# Patient Record
Sex: FEMALE | Race: WHITE | Employment: FULL TIME | URBAN - METROPOLITAN AREA
[De-identification: names, ages, dates, MRNs, and addresses within clinical notes are randomized per-mention and may not be internally consistent; named-entity substitution may affect disease eponyms.]

---

## 2021-04-27 ENCOUNTER — HOSPITAL ENCOUNTER (EMERGENCY)
Facility: CLINIC | Age: 61
Discharge: HOME | End: 2021-04-27
Attending: EMERGENCY MEDICINE | Admitting: EMERGENCY MEDICINE
Payer: COMMERCIAL

## 2021-04-27 ENCOUNTER — APPOINTMENT (OUTPATIENT)
Dept: MRI IMAGING | Facility: CLINIC | Age: 61
End: 2021-04-27
Attending: EMERGENCY MEDICINE
Payer: COMMERCIAL

## 2021-04-27 VITALS
RESPIRATION RATE: 18 BRPM | WEIGHT: 250 LBS | SYSTOLIC BLOOD PRESSURE: 165 MMHG | DIASTOLIC BLOOD PRESSURE: 87 MMHG | TEMPERATURE: 97.5 F | OXYGEN SATURATION: 96 % | HEIGHT: 63 IN | BODY MASS INDEX: 44.3 KG/M2 | HEART RATE: 96 BPM

## 2021-04-27 DIAGNOSIS — R20.2 PARESTHESIAS: ICD-10-CM

## 2021-04-27 LAB
ALBUMIN SERPL-MCNC: 3.6 G/DL (ref 3.4–5)
ALP SERPL-CCNC: 89 U/L (ref 40–150)
ALT SERPL W P-5'-P-CCNC: 50 U/L (ref 0–50)
ANION GAP SERPL CALCULATED.3IONS-SCNC: 3 MMOL/L (ref 3–14)
AST SERPL W P-5'-P-CCNC: 38 U/L (ref 0–45)
BASOPHILS # BLD AUTO: 0 10E9/L (ref 0–0.2)
BASOPHILS NFR BLD AUTO: 0.5 %
BILIRUB SERPL-MCNC: 0.9 MG/DL (ref 0.2–1.3)
BUN SERPL-MCNC: 19 MG/DL (ref 7–30)
CALCIUM SERPL-MCNC: 9 MG/DL (ref 8.5–10.1)
CHLORIDE SERPL-SCNC: 105 MMOL/L (ref 94–109)
CO2 SERPL-SCNC: 27 MMOL/L (ref 20–32)
CREAT SERPL-MCNC: 0.58 MG/DL (ref 0.52–1.04)
DIFFERENTIAL METHOD BLD: NORMAL
EOSINOPHIL # BLD AUTO: 0.2 10E9/L (ref 0–0.7)
EOSINOPHIL NFR BLD AUTO: 3 %
ERYTHROCYTE [DISTWIDTH] IN BLOOD BY AUTOMATED COUNT: 12.4 % (ref 10–15)
GFR SERPL CREATININE-BSD FRML MDRD: >90 ML/MIN/{1.73_M2}
GLUCOSE SERPL-MCNC: 227 MG/DL (ref 70–99)
HCT VFR BLD AUTO: 46.7 % (ref 35–47)
HGB BLD-MCNC: 15.5 G/DL (ref 11.7–15.7)
IMM GRANULOCYTES # BLD: 0 10E9/L (ref 0–0.4)
IMM GRANULOCYTES NFR BLD: 0.3 %
LYMPHOCYTES # BLD AUTO: 2.3 10E9/L (ref 0.8–5.3)
LYMPHOCYTES NFR BLD AUTO: 31.7 %
MCH RBC QN AUTO: 31.2 PG (ref 26.5–33)
MCHC RBC AUTO-ENTMCNC: 33.2 G/DL (ref 31.5–36.5)
MCV RBC AUTO: 94 FL (ref 78–100)
MONOCYTES # BLD AUTO: 0.5 10E9/L (ref 0–1.3)
MONOCYTES NFR BLD AUTO: 7.2 %
NEUTROPHILS # BLD AUTO: 4.2 10E9/L (ref 1.6–8.3)
NEUTROPHILS NFR BLD AUTO: 57.3 %
NRBC # BLD AUTO: 0 10*3/UL
NRBC BLD AUTO-RTO: 0 /100
PLATELET # BLD AUTO: 240 10E9/L (ref 150–450)
POTASSIUM SERPL-SCNC: 4.7 MMOL/L (ref 3.4–5.3)
PROT SERPL-MCNC: 7.4 G/DL (ref 6.8–8.8)
RBC # BLD AUTO: 4.97 10E12/L (ref 3.8–5.2)
SODIUM SERPL-SCNC: 135 MMOL/L (ref 133–144)
WBC # BLD AUTO: 7.3 10E9/L (ref 4–11)

## 2021-04-27 PROCEDURE — 99285 EMERGENCY DEPT VISIT HI MDM: CPT | Mod: 25

## 2021-04-27 PROCEDURE — 96374 THER/PROPH/DIAG INJ IV PUSH: CPT

## 2021-04-27 PROCEDURE — 80053 COMPREHEN METABOLIC PANEL: CPT | Performed by: EMERGENCY MEDICINE

## 2021-04-27 PROCEDURE — 72146 MRI CHEST SPINE W/O DYE: CPT | Mod: 52

## 2021-04-27 PROCEDURE — 250N000012 HC RX MED GY IP 250 OP 636 PS 637: Performed by: EMERGENCY MEDICINE

## 2021-04-27 PROCEDURE — 250N000011 HC RX IP 250 OP 636: Performed by: EMERGENCY MEDICINE

## 2021-04-27 PROCEDURE — 85025 COMPLETE CBC W/AUTO DIFF WBC: CPT | Performed by: EMERGENCY MEDICINE

## 2021-04-27 PROCEDURE — 72141 MRI NECK SPINE W/O DYE: CPT

## 2021-04-27 RX ORDER — LORAZEPAM 2 MG/ML
2 INJECTION INTRAMUSCULAR ONCE
Status: COMPLETED | OUTPATIENT
Start: 2021-04-27 | End: 2021-04-27

## 2021-04-27 RX ORDER — PREDNISONE 20 MG/1
40 TABLET ORAL ONCE
Status: COMPLETED | OUTPATIENT
Start: 2021-04-27 | End: 2021-04-27

## 2021-04-27 RX ORDER — METHYLPREDNISOLONE 4 MG
TABLET, DOSE PACK ORAL
Qty: 21 TABLET | Refills: 0 | Status: SHIPPED | OUTPATIENT
Start: 2021-04-27 | End: 2021-08-10

## 2021-04-27 RX ADMIN — PREDNISONE 40 MG: 20 TABLET ORAL at 22:52

## 2021-04-27 RX ADMIN — LORAZEPAM 2 MG: 2 INJECTION INTRAMUSCULAR; INTRAVENOUS at 19:05

## 2021-04-27 ASSESSMENT — ENCOUNTER SYMPTOMS
BACK PAIN: 1
HEADACHES: 0
SHORTNESS OF BREATH: 0
NUMBNESS: 1

## 2021-04-27 ASSESSMENT — MIFFLIN-ST. JEOR: SCORE: 1663.12

## 2021-04-27 NOTE — ED TRIAGE NOTES
Tingling in hands bilaterally since last Friday, also numbness from the waist down, more numb from the knees down. Seen at [facility] on Friday, states they ruled out stroke, but starting yesterday she is having trouble feeling when she has a bowel movement.

## 2021-04-27 NOTE — ED PROVIDER NOTES
"  History     Chief Complaint:  Tingling and Numbness       HPI  Flora Miranda is a 61 year old female tobacco user with a history of type 2 diabetes and HTN who presents for evaluation of tingling and numbness.The patient was seen in the ED at [facility] on 4/22/21 for paresthesias. The patient reports that she has had bilateral hand and finger tingling since last Friday (4/22/21) that worsened today. She also has back pain and numbness hat radiates down to her buttocks, legs, and feet that is worse on the left side. She notes that she has had loss of bowel control yesterday and one time earlier this week. She went to the chiropractor today for her back. She has recently been using a walker to get around due to the numbness in her feet. She denies any headache, shortness of breath, chest pain, or any recent falls.       Review of Systems   Respiratory: Negative for shortness of breath.    Cardiovascular: Negative for chest pain.   Genitourinary:        Bowel incontinence   Musculoskeletal: Positive for back pain and gait problem.   Neurological: Positive for numbness. Negative for headaches.        Tingling in fingers   All other systems reviewed and are negative.      Allergies:  No Known Allergies    Medications:   Amoxicillin   Tessalon Perles   Aspirin 81   Zestril   Lipitor  Glucotrol   Lantus  Metformin  Valtrex    Medical History:   Obesity  Type 2 diabetes    Past Surgical History:    Ovarian cyst removal    Family History:    Father: cancer, heart attack, HTN  Mother: emphysema  Brother: type 2 diabetes    Social History:  The patient was accompanied to the ED by boyfriend.  PCP: Donte, [facility]    Physical Exam     Patient Vitals for the past 24 hrs:   BP Temp Temp src Pulse Resp SpO2 Height Weight   04/27/21 1553 (!) 165/87 97.5  F (36.4  C) Oral 96 18 96 % 1.6 m (5' 3\") 113.4 kg (250 lb)      Physical Exam  Constitutional: heavy set, white female. Supine. No respiratory distress. "   HENT: No signs of trauma.   Eyes: EOM are normal. Pupils are equal, round, and reactive to light.   Neck: Normal range of motion. No JVD present. No cervical adenopathy. No posterior midline tenderness. No carotid bruit.  Cardiovascular: Regular rhythm.  Exam reveals no gallop and no friction rub.    No murmur heard.  Pulmonary/Chest: Bilateral breath sounds normal. No wheezes, rhonchi or rales.  Abdominal: Soft. No tenderness. No rebound or guarding.   Musculoskeletal: No edema. No tenderness. Positive straight leg raising on the left leg. No tenderness over thoracic or lumbar spine. Large lipoma right upper back.   Lymphadenopathy: No lymphadenopathy.   Neurological: Alert and oriented to person, place, and time. Normal strength. Coordination normal. Sensation intact to light touch;  Subjective sensation of pins and needles extending bilaterally up to knee and involving fingertips bilaterally. Reflexes absent both upper and lower extremities. Questionably decreased rectal tone. Normal perianal sensation.   Skin: Skin is warm and dry. . Moist reddened rash under lower abdominal fold.    Emergency Department Course   Imaging:  Cervical spine MRI w/o contrast  Multilevel degenerative change as detailed.    BRANDEE ARIAS MD  Reading per radiology     Thoracic spine MRI w/o contrast  Moderate to severe degenerative change with multiple  stenoses.    Mri L spine:  Refused    BRANDEE ARIAS MD  Reading per radiology     Laboratory:  CBC: WBC 7.3, HGB 15.5,   CMP: glucose 227 (Creatinine 0.58)    Emergency Department Course:  Reviewed:  1742 I reviewed nursing notes, vitals, past medical history and care everywhere    Assessments:  2235 I rechecked the patient and explained findings.     Interventions:  1905 Ativan 2 mg IV    Disposition:  The patient was discharged to home.   Impression & Plan   Medical Decision Making:  Flora Miranda is a 61 year old female that presents to the ED with tingling in her hands  and her lower extremities also developing some problems while passing her stool. Patient was seen at [facility] by Dr. Wiggins 5 days ago. EKG, blood, and urine were obtained, which were unremarkable and was discussed that the patient should follow-up with her primary for concerns she was developing diabetic neuropathy. Patient states she still has tingling in her finger tips and has tingling in her legs which extend from her toes all the way up to her knees on both sides. When asked about stool and urine incontinence she states that she has had two episodes where she needs to go to the bathroom with a bowel movement that does not make it to the restroom in time. This is new for her. She is controlling her urine well and she denies any perianal numbness. She did get a walker however because it has been difficult to walk with the tingling. She did see her chiropractor again this week who stressed that she should have further imaging. On my examination, objectively she has sensation, but subjectively she feels pins and needles in the above distribution. Her reflexes are remarkably flattened everywhere and she has perianal sensation, but rectal tone seems somewhat not flatulent, but seemed diminished. When I asked her to squeeze on my finger. I discussed with the patient that it was time to get MRI's since both hands and feet her involved. We decided to MRI her entire spine. Patient received some ativan since she thought she might be anxious with this. She completed the CT and T spine, but unfortunately, when it got to the L spine she would not continue. Her C spine and T spine showed significant degenerative changes, but no core lesions or abnormalities. I did talk to her when she got back about trying again and maybe with additional medicine, but she refused this. I also discussed admission and she refused that. I have talked to her about speaking with her primary tomorrow and possibly try to get an open-sided MRI  which would work better for her. This is not available at [facility]. I did recommend giving her some steroids to see if there was inflammation from possibly a degenerative lesion or core compression that this might be helpful and she did agree. Patient will follow-up with her primary tomorrow. I told her if symptoms worsen to come back to the ED or if she changes her mind and wants to do the MRI again to come back to the ED.     Diagnosis:     ICD-10-CM    1. Paresthesias  R20.2         Discharge Medications:  Discharge Medication List as of 4/27/2021 10:50 PM      START taking these medications    Details   methylPREDNISolone (MEDROL DOSEPAK) 4 MG tablet therapy pack Follow Package Directions, Disp-21 tablet, R-0, Local Print             Scribe Disclosure:  I, Zahra Lares, am serving as a scribe at 5:28 PM on 4/27/2021 to document services personally performed by Artis Warner MD based on my observations and the provider's statements to me.     [facility] Artis Valles MD  04/28/21 0006

## 2021-08-07 ENCOUNTER — HEALTH MAINTENANCE LETTER (OUTPATIENT)
Age: 61
End: 2021-08-07

## 2021-08-10 ENCOUNTER — OFFICE VISIT (OUTPATIENT)
Dept: FAMILY MEDICINE | Facility: CLINIC | Age: 61
End: 2021-08-10
Payer: COMMERCIAL

## 2021-08-10 VITALS
SYSTOLIC BLOOD PRESSURE: 112 MMHG | TEMPERATURE: 97.5 F | HEIGHT: 63 IN | DIASTOLIC BLOOD PRESSURE: 77 MMHG | RESPIRATION RATE: 10 BRPM | BODY MASS INDEX: 43.59 KG/M2 | WEIGHT: 246 LBS | HEART RATE: 93 BPM | OXYGEN SATURATION: 97 %

## 2021-08-10 DIAGNOSIS — E66.01 MORBID OBESITY (H): ICD-10-CM

## 2021-08-10 DIAGNOSIS — E11.9 TYPE 2 DIABETES MELLITUS WITHOUT COMPLICATION, WITH LONG-TERM CURRENT USE OF INSULIN (H): Primary | ICD-10-CM

## 2021-08-10 DIAGNOSIS — Z72.0 TOBACCO ABUSE: ICD-10-CM

## 2021-08-10 DIAGNOSIS — Z79.4 TYPE 2 DIABETES MELLITUS WITHOUT COMPLICATION, WITH LONG-TERM CURRENT USE OF INSULIN (H): Primary | ICD-10-CM

## 2021-08-10 PROBLEM — G62.89: Status: ACTIVE | Noted: 2021-05-27

## 2021-08-10 LAB — HBA1C MFR BLD: 8 % (ref 0–5.6)

## 2021-08-10 PROCEDURE — 99203 OFFICE O/P NEW LOW 30 MIN: CPT | Performed by: INTERNAL MEDICINE

## 2021-08-10 PROCEDURE — 83036 HEMOGLOBIN GLYCOSYLATED A1C: CPT | Performed by: INTERNAL MEDICINE

## 2021-08-10 PROCEDURE — 36415 COLL VENOUS BLD VENIPUNCTURE: CPT | Performed by: INTERNAL MEDICINE

## 2021-08-10 PROCEDURE — 82043 UR ALBUMIN QUANTITATIVE: CPT | Performed by: INTERNAL MEDICINE

## 2021-08-10 RX ORDER — LISINOPRIL 10 MG/1
10 TABLET ORAL DAILY
COMMUNITY
Start: 2021-07-03 | End: 2021-09-09

## 2021-08-10 RX ORDER — LISINOPRIL 5 MG/1
5 TABLET ORAL DAILY
COMMUNITY
Start: 2020-10-24 | End: 2021-08-10

## 2021-08-10 RX ORDER — ATORVASTATIN CALCIUM 20 MG/1
20 TABLET, FILM COATED ORAL
COMMUNITY
Start: 2020-12-17 | End: 2021-09-09

## 2021-08-10 RX ORDER — GLIPIZIDE 10 MG/1
TABLET ORAL
COMMUNITY
Start: 2021-08-05 | End: 2021-09-09

## 2021-08-10 ASSESSMENT — MIFFLIN-ST. JEOR: SCORE: 1644.98

## 2021-08-10 NOTE — PROGRESS NOTES
Assessment & Plan     Type 2 diabetes mellitus without complication, with long-term current use of insulin (H)  Sees eye doctor yearly  Last a1c 6.9.  She was previously on ozempic instead of insulin and reports well controlled diabetes, however insurance stopped covering. She plans to look into this given another insurance change and if feasible, she can reach out to be and rx can be provided. I would recommend holding insulin if starting ozempic and close follow-up. She tolerated it well.  Update labs today on insulin/metformin/glipizie  Continue statin/ACEi  - Hemoglobin A1c  - Albumin Random Urine Quantitative with Creat Ratio    Tobacco abuse  Smoking cessation discussed, she is not quite ready    Morbid obesity (H)      Return in about 3 months (around 11/10/2021) for with me, in person, sooner if symptoms worsen or do not improve, Follow up.    Nicki Renee DO  [facility] CELESTE Hines is a 61 year old who presents for the following health issues     HPI     New Patient/Transfer of Care    Former PCP: [facility]  Problem list and medications reviewed and updated. See below for additional notes.    Patient was evaluted in ER for acute parasthesias and referred to neuro for possible guillan barre, saw two neurologists. With time self improved. Has residual tingling in L distal fingers, but also told possible carpal tunnel. No relation to vaccines. No clear cause. Used walker for two weeks, now no walker at all. Did not have to get therapeutic infusion. Initial neuro was not in network so saw another neuro and by then symptoms better.    Last a1c was 6.9, back in Dec.   Diabetes-was on ozempic but insurance stopped covering, tried every avenue to get coverage. Did not work. Therefore, switched to lantus. States a1c was right around 7 on ozempic. No SE.    Smokes 1/2 pack a day, not looking to quit right now.      Review of Systems   Constitutional, HEENT, cardiovascular,  "pulmonary, gi and gu systems are negative, except as otherwise noted.      Objective    /77 (BP Location: Left arm, Patient Position: Sitting, Cuff Size: Adult Large)   Pulse 93   Temp 97.5  F (36.4  C) (Temporal)   Resp 10   Ht 1.6 m (5' 3\")   Wt 111.6 kg (246 lb)   SpO2 97%   BMI 43.58 kg/m    Body mass index is 43.58 kg/m .  Physical Exam     GENERAL APPEARANCE: AAOx3, no distress. Well developed.    PSYCH: appropriate mood and affect.               "

## 2021-08-11 LAB
CREAT UR-MCNC: 112 MG/DL
MICROALBUMIN UR-MCNC: 40 MG/L
MICROALBUMIN/CREAT UR: 35.71 MG/G CR (ref 0–25)

## 2021-08-24 ENCOUNTER — VIRTUAL VISIT (OUTPATIENT)
Dept: PHARMACY | Facility: CLINIC | Age: 61
End: 2021-08-24
Payer: COMMERCIAL

## 2021-08-24 DIAGNOSIS — Z23 NEED FOR VACCINATION: ICD-10-CM

## 2021-08-24 DIAGNOSIS — E78.5 HYPERLIPIDEMIA LDL GOAL <100: ICD-10-CM

## 2021-08-24 DIAGNOSIS — I10 BENIGN ESSENTIAL HYPERTENSION: ICD-10-CM

## 2021-08-24 DIAGNOSIS — E11.9 TYPE 2 DIABETES MELLITUS WITHOUT COMPLICATION, WITH LONG-TERM CURRENT USE OF INSULIN (H): Primary | ICD-10-CM

## 2021-08-24 DIAGNOSIS — Z78.9 TAKES DIETARY SUPPLEMENTS: ICD-10-CM

## 2021-08-24 DIAGNOSIS — Z79.4 TYPE 2 DIABETES MELLITUS WITHOUT COMPLICATION, WITH LONG-TERM CURRENT USE OF INSULIN (H): Primary | ICD-10-CM

## 2021-08-24 PROCEDURE — 99207 PR NO CHARGE LOS: CPT | Performed by: PHARMACIST

## 2021-08-24 RX ORDER — UREA 10 %
2000 LOTION (ML) TOPICAL DAILY
COMMUNITY
End: 2022-03-16

## 2021-08-24 NOTE — PROGRESS NOTES
Medication Therapy Management (MTM) Encounter    ASSESSMENT:                            Medication Adherence/Access: No issues identified    Type 2 Diabetes: Patient is not meeting A1c goal of < 7%. Self monitoring of blood glucose is not at goal of fasting  mg/dL and post prandial < 180 mg/dL.  Would like to get her back on a GLP1 RA if possible.    Hypertension: Stable. Patient is meeting blood pressure goal of < 130/80mmHg.     Hyperlipidemia: Stable.  Patient is on moderate intensity statin which is indicated based on 2019 ACC/AHA guidelines for lipid management.       Supplements:  Carotenoids have been associated with increased risk of lung cancer in those who smoke or have a history of smoking.  She may benefit from discontinuing Cell Wise.  She's also taking high doses of potassium and magnesium - last potassium level was WNL, may benefit from checking magnesium level (not discussed today).    Immunizations:  She's due for several vaccinations.    PLAN:                            1.  Could consider changing back to GLP1 RA.  Will email the names of once weekly GLP1 RA so she can talk with insurance about coverage.  From Epic - it appears Ozempic is covered, but unclear what her co-pay would be.  2.  Recommend stopping Cell Wise due to carotenoids.  Given current smoking use, this potentially may increase the risk of lung cancer.  3.  Recommended several vaccines - she declined.  4.  Future considerations - check magnesium level.    Follow-up: Return in about 2 weeks (around 9/7/2021) for check in via [employer].    SUBJECTIVE/OBJECTIVE:                          Flora Miranda is a 61 year old female called for an initial visit. She was referred to me from Dr. Renee.      Reason for visit: Comprehensive medication review.    Allergies/ADRs: None  Past Medical History: Reviewed in chart  Tobacco: She reports that she has been smoking. She has a 22.50 pack-year smoking history. She has never used smokeless  tobacco.Tobacco Cessation Action Plan:   Information offered: Patient not interested at this time   Alcohol: Less than 1 beverages / week    Medication Adherence/Access: Patient uses pill box(es).  Patient takes medications 2 time(s) per day.  Per patient, misses medication 0 times per week.     Type 2 Diabetes:  Currently taking metformin 1000mg twice daily, glipizide 10mg twice daily and Lantus 30 units daily. Patient is not experiencing side effects.  She was on Ozempic instead of Lantus in the past - this worked great (A1c 6.9%) but when she changed insurance it wasn't covered.  She's since changed insurance again, but hasn't inquired about coverage.  Blood sugar monitoring: rarely. Ranges (patient reported): 150-220mg/dL (higher in the evening)  Symptoms of low blood sugar? none  Symptoms of high blood sugar? none  Eye exam: due  Foot exam: due  Aspirin: Taking 81mg daily and denies side effects  Statin: Yes: atorvastatin   ACEi/ARB: Yes: lisinopril.   Urine Albumin:   Lab Results   Component Value Date    UMALCR 35.71 (H) 08/10/2021      Lab Results   Component Value Date    A1C 8.0 08/10/2021     Hypertension: Current medications include lisinopril 10mg daily.  Patient does not self-monitor blood pressure.  Patient reports no current medication side effects.  BP Readings from Last 3 Encounters:   08/10/21 112/77   04/27/21 (!) 165/87   03/16/11 122/80      Hyperlipidemia: Current therapy includes atorvastatin 20mg daily.  Patient reports no significant myalgias or other side effects.  From Care Everywhere - 12/4/20:      Supplements:  Current supplements include: calcium/vitamin D 1 tablet daily, magnesium gluconate 2000mg daily, a daily multivitamin, Vitamin D daily (dose unknown), potassium 2000mg daily (product unknown) and Melaluca Cell Wise 1 tablet daily (contains olive extract, grape seed extract, vitamin C, carotenoids, lycopene and tocopherols).  She finds these effective and prefers to continue  taking.  Potassium   Date Value Ref Range Status   04/27/2021 4.7 3.4 - 5.3 mmol/L Final     Comment:     Specimen slightly hemolyzed, potassium may be falsely elevated      No results found for: MAG     Immunizations:  Patient declines all vaccines, including COVID-19 vaccine.  Immunization History   Administered Date(s) Administered     Tdap (Adacel,Boostrix) 04/18/2013      Today's Vitals: There were no vitals taken for this visit.  ----------------    I spent 17 minutes with this patient today. A copy of the visit note was provided to the patient's primary care provider.    The patient was sent via [employer] a summary of these recommendations.     Gema Figueredo, PharmD, [facility]  Medication Therapy Management Provider  Pager: [phone]     Telemedicine Visit Details  Type of service:  Telephone visit  Start Time: 10:02 AM  End Time: 10:19 AM  Originating Location (patient location): [place]  Distant Location (provider location):  [facility]     Medication Therapy Recommendations  Need for vaccination    Rationale: Preventive therapy - Needs additional medication therapy - Indication   Recommendation: Start Medication - COVID-19 MRNA VIRUS VACCINE   Status: Declined per Patient         Takes dietary supplements    Current Medication: UNABLE TO FIND   Rationale: Unsafe medication for the patient - Adverse medication event - Safety   Recommendation: Discontinue Medication   Status: Accepted - no CPA Needed         Type 2 diabetes mellitus without complication (H)    Current Medication: insulin glargine (LANTUS PEN) 100 UNIT/ML pen   Rationale: More effective medication available - Ineffective medication - Effectiveness   Recommendation: Change Medication - Ozempic (0.25 or 0.5 MG/DOSE) 2 MG/1.5ML Sopn   Status: Contact Provider - Awaiting Response

## 2021-08-24 NOTE — PATIENT INSTRUCTIONS
Recommendations from today's MTM visit:                                                    MTM (medication therapy management) is a service provided by a clinical pharmacist designed to help you get the most of out of your medicines.   Today we reviewed what your medicines are for, how to know if they are working, that your medicines are safe and how to make your medicine regimen as easy as possible.      1.  Please check with your insurance to see if Ozempic, Trulicity or Bydureon would be covered.    2.  Recommend stopping Cell Wise due to carotenoids.  Given current smoking use, this potentially may increase the risk of lung cancer.    3.  You are due for several vaccines, but declined these.    Follow-up: Return in about 2 weeks (around 9/7/2021) for check in via [employer]    It was great to speak with you today.  I value your experience and would be very thankful for your time with providing feedback on our clinic survey. You may receive a survey via email or text message in the next few days.     To schedule another MTM appointment, please call the clinic directly or you may call the MTM scheduling line at [phone] or toll-free at [phone].     My Clinical Pharmacist's contact information:                                                      Please feel free to contact me with any questions or concerns you have.      Gema Figueredo PharmD, [facility]  Medication Therapy Management Provider  Pager: [phone]     Yue Malagon, Sher[place]  Medication Therapy Management Resident  Pager: [phone]

## 2021-09-03 ENCOUNTER — MYC MEDICAL ADVICE (OUTPATIENT)
Dept: FAMILY MEDICINE | Facility: CLINIC | Age: 61
End: 2021-09-03

## 2021-09-03 DIAGNOSIS — E11.9 TYPE 2 DIABETES MELLITUS WITHOUT COMPLICATION, WITH LONG-TERM CURRENT USE OF INSULIN (H): Primary | ICD-10-CM

## 2021-09-03 DIAGNOSIS — Z79.4 TYPE 2 DIABETES MELLITUS WITHOUT COMPLICATION, WITH LONG-TERM CURRENT USE OF INSULIN (H): Primary | ICD-10-CM

## 2021-09-09 RX ORDER — GLIPIZIDE 10 MG/1
TABLET ORAL
Qty: 180 TABLET | Refills: 3 | Status: SHIPPED | OUTPATIENT
Start: 2021-09-09 | End: 2021-09-13

## 2021-09-09 RX ORDER — LISINOPRIL 10 MG/1
10 TABLET ORAL DAILY
Qty: 90 TABLET | Refills: 3 | Status: SHIPPED | OUTPATIENT
Start: 2021-09-09 | End: 2021-09-13

## 2021-09-09 RX ORDER — ATORVASTATIN CALCIUM 20 MG/1
20 TABLET, FILM COATED ORAL DAILY
Qty: 90 TABLET | Refills: 3 | Status: SHIPPED | OUTPATIENT
Start: 2021-09-09 | End: 2021-09-13

## 2021-09-09 NOTE — TELEPHONE ENCOUNTER
Routing refill request to provider for review/approval because:  Medication is reported/historical     Adeola JETER RN

## 2021-09-13 DIAGNOSIS — E11.9 TYPE 2 DIABETES MELLITUS WITHOUT COMPLICATION, WITH LONG-TERM CURRENT USE OF INSULIN (H): ICD-10-CM

## 2021-09-13 DIAGNOSIS — Z79.4 TYPE 2 DIABETES MELLITUS WITHOUT COMPLICATION, WITH LONG-TERM CURRENT USE OF INSULIN (H): ICD-10-CM

## 2021-09-13 RX ORDER — ATORVASTATIN CALCIUM 20 MG/1
20 TABLET, FILM COATED ORAL DAILY
Qty: 90 TABLET | Refills: 3 | Status: SHIPPED | OUTPATIENT
Start: 2021-09-13 | End: 2022-11-16

## 2021-09-13 RX ORDER — LISINOPRIL 10 MG/1
10 TABLET ORAL DAILY
Qty: 90 TABLET | Refills: 3 | Status: SHIPPED | OUTPATIENT
Start: 2021-09-13 | End: 2022-10-06

## 2021-09-13 RX ORDER — GLIPIZIDE 10 MG/1
TABLET ORAL
Qty: 180 TABLET | Refills: 3 | Status: SHIPPED | OUTPATIENT
Start: 2021-09-13 | End: 2022-11-01

## 2021-09-13 NOTE — TELEPHONE ENCOUNTER
Prescription approved per [facility] Refill Protocol.  Maisha Ruiz RN  [facility]

## 2021-09-13 NOTE — TELEPHONE ENCOUNTER
Routing refill request to provider for review/approval because:  Labs out of range:  No results found for: LDL      Maisha Ruiz RN  [facility] Triage

## 2021-10-02 ENCOUNTER — HEALTH MAINTENANCE LETTER (OUTPATIENT)
Age: 61
End: 2021-10-02

## 2021-11-27 ENCOUNTER — HEALTH MAINTENANCE LETTER (OUTPATIENT)
Age: 61
End: 2021-11-27

## 2021-12-28 ENCOUNTER — OFFICE VISIT (OUTPATIENT)
Dept: URGENT CARE | Facility: URGENT CARE | Age: 61
End: 2021-12-28
Payer: COMMERCIAL

## 2021-12-28 VITALS
HEART RATE: 89 BPM | BODY MASS INDEX: 44.99 KG/M2 | RESPIRATION RATE: 20 BRPM | OXYGEN SATURATION: 95 % | SYSTOLIC BLOOD PRESSURE: 130 MMHG | WEIGHT: 254 LBS | TEMPERATURE: 98.5 F | DIASTOLIC BLOOD PRESSURE: 78 MMHG

## 2021-12-28 DIAGNOSIS — J01.90 ACUTE SINUSITIS WITH COEXISTING CONDITION REQUIRING PROPHYLACTIC TREATMENT: Primary | ICD-10-CM

## 2021-12-28 PROCEDURE — 99213 OFFICE O/P EST LOW 20 MIN: CPT | Performed by: FAMILY MEDICINE

## 2022-03-01 ENCOUNTER — TELEPHONE (OUTPATIENT)
Dept: PHARMACY | Facility: CLINIC | Age: 62
End: 2022-03-01
Payer: COMMERCIAL

## 2022-03-01 NOTE — TELEPHONE ENCOUNTER
We have been unable to reach this patient for MTM follow-up after several attempts. We will stop reaching out to the patient at this time. Please let us know if we can assist in this patient's care in the future.    Routing to PCP as Christina HuffD, [facility]  Medication Therapy Management Provider  Pager: [phone]

## 2022-03-16 ENCOUNTER — VIRTUAL VISIT (OUTPATIENT)
Dept: PHARMACY | Facility: CLINIC | Age: 62
End: 2022-03-16
Payer: COMMERCIAL

## 2022-03-16 DIAGNOSIS — Z79.4 TYPE 2 DIABETES MELLITUS WITHOUT COMPLICATION, WITH LONG-TERM CURRENT USE OF INSULIN (H): Primary | ICD-10-CM

## 2022-03-16 DIAGNOSIS — E78.5 HYPERLIPIDEMIA LDL GOAL <100: ICD-10-CM

## 2022-03-16 DIAGNOSIS — I10 BENIGN ESSENTIAL HYPERTENSION: ICD-10-CM

## 2022-03-16 DIAGNOSIS — Z13.21 ENCOUNTER FOR VITAMIN DEFICIENCY SCREENING: ICD-10-CM

## 2022-03-16 DIAGNOSIS — E11.9 TYPE 2 DIABETES MELLITUS WITHOUT COMPLICATION, WITH LONG-TERM CURRENT USE OF INSULIN (H): Primary | ICD-10-CM

## 2022-03-16 PROCEDURE — 99207 PR NO CHARGE LOS: CPT | Performed by: PHARMACIST

## 2022-03-16 RX ORDER — DULAGLUTIDE 0.75 MG/.5ML
0.75 INJECTION, SOLUTION SUBCUTANEOUS
Qty: 2 ML | Refills: 0 | Status: SHIPPED | OUTPATIENT
Start: 2022-03-16 | End: 2022-04-18

## 2022-03-16 RX ORDER — DULAGLUTIDE 1.5 MG/.5ML
1.5 INJECTION, SOLUTION SUBCUTANEOUS
Qty: 2 ML | Refills: 2 | Status: SHIPPED | OUTPATIENT
Start: 2022-03-16 | End: 2022-06-23

## 2022-03-16 NOTE — PATIENT INSTRUCTIONS
Recommendations from today's MTM visit:                                                    MTM (medication therapy management) is a service provided by a clinical pharmacist designed to help you get the most of out of your medicines.   Today we reviewed what your medicines are for, how to know if they are working, that your medicines are safe and how to make your medicine regimen as easy as possible.      1. You are due for labs (A1c, lipid panel, BMP, vitamin D)    2. It looks like insurance prefers Trulicity, so we will try sending in a prescription for Trulicity to determine coverage. We can also try using a coupon to bring down the copay for you. You will start by injecting Trulicity 0.75 mg once weekly for 4 weeks. If you are tolerating well, you can increase to 1.5 mg once weekly thereafter.     3. Yue will send in a prescription for FreeStyle Susannah 2 CGM to test your blood sugars.     4. Make an appointment with Dr. Renee    Follow-up:  Smarp Oy message to follow-up on GLP-1/CGM; phone visit on 4/18    It was great to speak with you today.  I value your experience and would be very thankful for your time with providing feedback on our clinic survey. You may receive a survey via email or text message in the next few days.     To schedule another MTM appointment, please call the clinic directly or you may call the MTM scheduling line at [phone] or toll-free at [phone].     My Clinical Pharmacist's contact information:                                                      Please feel free to contact me with any questions or concerns you have.      Yue Malagon, PharmD   Medication Therapy Management   Pharmacist Resident  Pager: [phone]

## 2022-03-19 ENCOUNTER — HEALTH MAINTENANCE LETTER (OUTPATIENT)
Age: 62
End: 2022-03-19

## 2022-04-17 NOTE — PROGRESS NOTES
Medication Therapy Management (MTM) Encounter    ASSESSMENT:                            Medication Adherence/Access: No issues identified    Type 2 Diabetes: Patient is not meeting A1c goal of < 8%. Self monitoring of blood glucose is at goal of fasting  mg/dL and post prandial < 180 mg/dL. It is unclear if patient is meeting goal of >70% time in target with continuous glucose monitoring. However, it seems that Trulicity has been a good change for her. Could consider increasing Trulicity dose in future if needed. Patient due for A1c, BMP, foot exam, and eye exam.     Hypertension: Stable. Patient is meeting blood pressure goal of < 140/90mmHg.    Hyperlipidemia: Stable. Patient is on moderate intensity statin which is indicated based on 2019 ACC/AHA guidelines for lipid management. Patient due for lipid panel.     Immunizations: Patient is due for the Shingrix, COVID, influenza, and pneumonia vaccinations. This was discussed with patient, but she prefers not to get at this time.     PLAN:                            1. You are due to get labs. As we discussed, you can get these before your next appointment with Dr. Renee in May.     2. You are due to get your eyes checked. At your appointment with Dr. Renee, have her check your feet as well.     Follow-up: check-in after PCP visit on 5/16/22    SUBJECTIVE/OBJECTIVE:                          Flora Miranda is a 62 year old female called for a follow-up visit.  Today's visit is a follow-up MTM visit from 3/16/22     Reason for visit: diabetes f/up.    Allergies/ADRs: Reviewed in chart  Past Medical History: Reviewed in chart  Tobacco: She reports that she has been smoking. She has a 22.50 pack-year smoking history. She has never used smokeless tobacco.   Tobacco Cessation Action Plan:   Information offered: Patient not interested at this time    Medication Adherence/Access: no issues reported. Reports perfect adherence with medication regimen and finds cost of  medications to be affordable.     Type 2 Diabetes:  Currently taking metformin 1000 mg twice daily, glipizide 10 mg twice daily, and Trulicity 1.5 mg weekly (Sundays). Sometimes experiences some nausea after Trulicity, but otherwise tolerates the medication well. She likes the Trulicity much better than the insulin.   Reports that she has lost about 10 pounds since starting Trulicity.  Blood sugar monitoring: Continuous Glucose Monitor. Ranges (patient reported): 117 - 179 mg/dL. She is unable to share her CGM data during the phone call, but plans to accept the remote sharing invite afterwards.   Symptoms of low blood sugar? Felt nauseous at 107 mg/dL, but felt better after she ate something  Symptoms of high blood sugar? None - feels like she isn't as tired as she used to be  Eye exam: due - will schedule soon   Foot exam: due  Diet/Exercise: She reports she has been walking a lot more. Feeling full faster so eating less.   Aspirin: Taking 81mg daily and denies side effects  Statin: Yes: atorvastatin 20 mg daily   ACEi/ARB: Yes: lisinopril 10 mg daily.   Urine Albumin:   Lab Results   Component Value Date    UMALCR 35.71 (H) 08/10/2021      Lab Results   Component Value Date    A1C 8.0 08/10/2021     Hypertension: Current medications include lisinopril 10 mg daily.  Patient does not self-monitor blood pressure.  Patient reports no current medication side effects.  BP Readings from Last 3 Encounters:   12/28/21 130/78   08/10/21 112/77   04/27/21 (!) 165/87     Hyperlipidemia: Current therapy includes atorvastatin 20 mg daily.  Patient reports no significant myalgias or other side effects.  The ASCVD Risk score (Edwards PABLO Jr., et al., 2013) failed to calculate for the following reasons:    Cannot find a previous HDL lab    Cannot find a previous total cholesterol lab  From Care Everywhere - 12/4/20:    Immunizations: Patient has not yet had Shingrix, COVID, influenza, and pneumonia vaccinations.  Most Recent  Immunizations   Administered Date(s) Administered     Tdap (Adacel,Boostrix) 04/18/2013     Today's Vitals: There were no vitals taken for this visit.  ----------------  I spent 15 minutes with this patient today. All changes were made via collaborative practice agreement with Nicki Renee DO. A copy of the visit note was provided to the patient's provider(s).    The patient was sent via [employer] a summary of these recommendations.     Yue Malagon, ChristinaD   Pharmaceutical Care Resident   Medication Therapy Management    The patient was seen independently by Dr. Malagon.  I have read the note and agree with the assessment and plan.  Gema Figueredo PharmD, [facility]  Medication Therapy Management Provider  Pager: [phone]      Telemedicine Visit Details  Type of service:  Telephone visit  Start Time: 8:30 AM  End Time: 8:45 AM  Originating Location (patient location): [place]  Distant Location (provider location):  [facility]     Medication Therapy Recommendations  No medication therapy recommendations to display

## 2022-04-18 ENCOUNTER — VIRTUAL VISIT (OUTPATIENT)
Dept: PHARMACY | Facility: CLINIC | Age: 62
End: 2022-04-18
Payer: COMMERCIAL

## 2022-04-18 DIAGNOSIS — Z79.4 TYPE 2 DIABETES MELLITUS WITHOUT COMPLICATION, WITH LONG-TERM CURRENT USE OF INSULIN (H): Primary | ICD-10-CM

## 2022-04-18 DIAGNOSIS — Z23 NEED FOR VACCINATION: ICD-10-CM

## 2022-04-18 DIAGNOSIS — E11.9 TYPE 2 DIABETES MELLITUS WITHOUT COMPLICATION, WITH LONG-TERM CURRENT USE OF INSULIN (H): Primary | ICD-10-CM

## 2022-04-18 DIAGNOSIS — E78.5 HYPERLIPIDEMIA LDL GOAL <100: ICD-10-CM

## 2022-04-18 DIAGNOSIS — I10 BENIGN ESSENTIAL HYPERTENSION: ICD-10-CM

## 2022-04-18 PROCEDURE — 99207 PR NO CHARGE LOS: CPT | Performed by: PHARMACIST

## 2022-04-18 NOTE — PATIENT INSTRUCTIONS
"Recommendations from today's MTM visit:                                                       1. You are due to get labs. As we discussed, you can get these before your next appointment with Dr. Renee in May.     2. You are due to get your eyes checked. At your appointment with Dr. Renee, have her check your feet as well.     Follow-up: check-in after PCP visit on 5/16/22    It was great speaking with you today.  I value your experience and would be very thankful for your time in providing feedback in our clinic survey. In the next few days, you may receive an email or text message from [employer] with a link to a survey related to your  clinical pharmacist.\"     To schedule another MTM appointment, please call the clinic directly or you may call the MTM scheduling line at [phone] or toll-free at [phone].     My Clinical Pharmacist's contact information:                                                      Please feel free to contact me with any questions or concerns you have.      Yue Malagon, PharmD   Medication Therapy Management   Pharmacist Resident  Pager: [phone]     "

## 2022-05-11 ENCOUNTER — LAB (OUTPATIENT)
Dept: LAB | Facility: CLINIC | Age: 62
End: 2022-05-11
Payer: COMMERCIAL

## 2022-05-11 DIAGNOSIS — E11.9 TYPE 2 DIABETES MELLITUS WITHOUT COMPLICATION, WITH LONG-TERM CURRENT USE OF INSULIN (H): ICD-10-CM

## 2022-05-11 DIAGNOSIS — E78.5 HYPERLIPIDEMIA LDL GOAL <100: ICD-10-CM

## 2022-05-11 DIAGNOSIS — Z79.4 TYPE 2 DIABETES MELLITUS WITHOUT COMPLICATION, WITH LONG-TERM CURRENT USE OF INSULIN (H): ICD-10-CM

## 2022-05-11 DIAGNOSIS — Z13.21 ENCOUNTER FOR VITAMIN DEFICIENCY SCREENING: ICD-10-CM

## 2022-05-11 LAB
ANION GAP SERPL CALCULATED.3IONS-SCNC: 7 MMOL/L (ref 3–14)
BUN SERPL-MCNC: 20 MG/DL (ref 7–30)
CALCIUM SERPL-MCNC: 9.8 MG/DL (ref 8.5–10.1)
CHLORIDE BLD-SCNC: 107 MMOL/L (ref 94–109)
CHOLEST SERPL-MCNC: 103 MG/DL
CO2 SERPL-SCNC: 25 MMOL/L (ref 20–32)
CREAT SERPL-MCNC: 0.7 MG/DL (ref 0.52–1.04)
DEPRECATED CALCIDIOL+CALCIFEROL SERPL-MC: 58 UG/L (ref 20–75)
FASTING STATUS PATIENT QL REPORTED: YES
GFR SERPL CREATININE-BSD FRML MDRD: >90 ML/MIN/1.73M2
GLUCOSE BLD-MCNC: 118 MG/DL (ref 70–99)
HBA1C MFR BLD: 7.6 % (ref 0–5.6)
HDLC SERPL-MCNC: 36 MG/DL
LDLC SERPL CALC-MCNC: 51 MG/DL
NONHDLC SERPL-MCNC: 67 MG/DL
POTASSIUM BLD-SCNC: 4.1 MMOL/L (ref 3.4–5.3)
SODIUM SERPL-SCNC: 139 MMOL/L (ref 133–144)
TRIGL SERPL-MCNC: 82 MG/DL

## 2022-05-11 PROCEDURE — 80061 LIPID PANEL: CPT

## 2022-05-11 PROCEDURE — 36415 COLL VENOUS BLD VENIPUNCTURE: CPT

## 2022-05-11 PROCEDURE — 80048 BASIC METABOLIC PNL TOTAL CA: CPT

## 2022-05-11 PROCEDURE — 83036 HEMOGLOBIN GLYCOSYLATED A1C: CPT

## 2022-05-11 PROCEDURE — 82306 VITAMIN D 25 HYDROXY: CPT

## 2022-05-16 ENCOUNTER — OFFICE VISIT (OUTPATIENT)
Dept: FAMILY MEDICINE | Facility: CLINIC | Age: 62
End: 2022-05-16
Payer: COMMERCIAL

## 2022-05-16 VITALS
OXYGEN SATURATION: 98 % | HEART RATE: 83 BPM | DIASTOLIC BLOOD PRESSURE: 84 MMHG | SYSTOLIC BLOOD PRESSURE: 132 MMHG | TEMPERATURE: 97.5 F | RESPIRATION RATE: 16 BRPM | WEIGHT: 237 LBS | HEIGHT: 63 IN | BODY MASS INDEX: 41.99 KG/M2

## 2022-05-16 DIAGNOSIS — Z12.31 ENCOUNTER FOR SCREENING MAMMOGRAM FOR BREAST CANCER: ICD-10-CM

## 2022-05-16 DIAGNOSIS — Z12.4 SCREENING FOR CERVICAL CANCER: ICD-10-CM

## 2022-05-16 DIAGNOSIS — E66.01 MORBID OBESITY (H): ICD-10-CM

## 2022-05-16 DIAGNOSIS — R22.9 SKIN MASS: ICD-10-CM

## 2022-05-16 DIAGNOSIS — Z79.4 TYPE 2 DIABETES MELLITUS WITH HYPERGLYCEMIA, WITH LONG-TERM CURRENT USE OF INSULIN (H): Primary | ICD-10-CM

## 2022-05-16 DIAGNOSIS — Z12.11 COLON CANCER SCREENING: ICD-10-CM

## 2022-05-16 DIAGNOSIS — E11.65 TYPE 2 DIABETES MELLITUS WITH HYPERGLYCEMIA, WITH LONG-TERM CURRENT USE OF INSULIN (H): Primary | ICD-10-CM

## 2022-05-16 PROCEDURE — 99207 PR FOOT EXAM NO CHARGE: CPT | Performed by: INTERNAL MEDICINE

## 2022-05-16 PROCEDURE — 99214 OFFICE O/P EST MOD 30 MIN: CPT | Performed by: INTERNAL MEDICINE

## 2022-05-16 NOTE — PROGRESS NOTES
Assessment & Plan     Type 2 diabetes mellitus with hyperglycemia, with long-term current use of insulin (H)  Improving with trulicity (off insulin). Continue efforts with MTM  Update eye exam, she assures me she will schedule with her specialist  Foot exam updated today  Labs UTD  - FOOT EXAM    Morbid obesity (H)  Has lost 20 pounds intentionally    Colon cancer screening  Never had cscope before, has done FIT test in the past.  Discussed gold standard for colon cancer screening is colonoscopy though various forms of screening currently exist. Discussed FIT test has risk of false positives/false negatives. Patient would like to think about this and get back to me.    Encounter for screening mammogram for breast cancer  Overdue, referral provided  - MA Screen Bilateral w/Lopez    Screening for cervical cancer  Overdue, referral provided to ob/gyn for pap  - Ob/Gyn Referral    Skin mass  Seen on exam, large R sided upper back mass with flucuance. She admits to growth. No pain. Refer to derm for eval and likely removal/biopsy.  - Adult Dermatology Referral    Return in about 3 months (around 8/16/2022) for Follow up, with me, in person, sooner if symptoms worsen or do not improve.    Nicki Renee DO  [facility] CELESTE Hines is a 62 year old who presents for the following health issues     History of Present Illness       Diabetes:   She presents for follow up of diabetes.  She is checking home blood glucose four or more times daily. She checks blood glucose before and after meals.  Blood glucose is never over 200 and never under 70. She is aware of hypoglycemia symptoms including shakiness. She has no concerns regarding her diabetes at this time.  She is having numbness in feet and burning in feet. The patient has not had a diabetic eye exam in the last 12 months.         She eats 0-1 servings of fruits and vegetables daily.She consumes 0 sweetened beverage(s) daily.She exercises with  "enough effort to increase her heart rate 9 or less minutes per day.  She exercises with enough effort to increase her heart rate 3 or less days per week.   She is taking medications regularly.     Flora presents for routine follow-up.  She has lost 20 pounds intentionally, feeling very good on trulicity, states off insulin. Started trulicity 1-2 months age per patient.  Has eye doctor, due for eye exam  Due for foot exam  Due for colon cancer screening  Due for mammogram  Due for shingrix/pneumovax/COVID-declines all, declines education.  Smoking-declines lung cancer screening. Reports not interested in smoking cessation measures at this time.    Labs recently done and reviewed.        Review of Systems   Constitutional, HEENT, cardiovascular, pulmonary, gi and gu systems are negative, except as otherwise noted.      Objective    /84 (BP Location: Right arm, Patient Position: Sitting, Cuff Size: Adult Large)   Pulse 83   Temp 97.5  F (36.4  C)   Resp 16   Ht 1.6 m (5' 3\")   Wt 107.5 kg (237 lb)   SpO2 98%   BMI 41.98 kg/m    Body mass index is 41.98 kg/m .  Physical Exam     GENERAL APPEARANCE: AAOx3, no distress. Well developed.    Diabetic foot exam:  no trophic changes or ulcerative lesions and normal monofilament exam bilaterally    PSYCH: appropriate mood and affect.               "

## 2022-06-23 ENCOUNTER — E-VISIT (OUTPATIENT)
Dept: URGENT CARE | Facility: CLINIC | Age: 62
End: 2022-06-23
Payer: COMMERCIAL

## 2022-06-23 DIAGNOSIS — Z79.4 TYPE 2 DIABETES MELLITUS WITHOUT COMPLICATION, WITH LONG-TERM CURRENT USE OF INSULIN (H): ICD-10-CM

## 2022-06-23 DIAGNOSIS — E11.9 TYPE 2 DIABETES MELLITUS WITHOUT COMPLICATION, WITH LONG-TERM CURRENT USE OF INSULIN (H): ICD-10-CM

## 2022-06-23 PROCEDURE — 99207 PR NON-BILLABLE SERV PER CHARTING: CPT

## 2022-06-23 RX ORDER — DULAGLUTIDE 1.5 MG/.5ML
1.5 INJECTION, SOLUTION SUBCUTANEOUS
Qty: 6 ML | Refills: 2 | Status: SHIPPED | OUTPATIENT
Start: 2022-06-23 | End: 2023-03-14

## 2022-09-01 ENCOUNTER — TRANSFERRED RECORDS (OUTPATIENT)
Dept: HEALTH INFORMATION MANAGEMENT | Facility: CLINIC | Age: 62
End: 2022-09-01

## 2022-09-01 LAB — RETINOPATHY: NORMAL

## 2022-09-03 ENCOUNTER — HEALTH MAINTENANCE LETTER (OUTPATIENT)
Age: 62
End: 2022-09-03

## 2022-09-19 ENCOUNTER — VIRTUAL VISIT (OUTPATIENT)
Dept: PHARMACY | Facility: CLINIC | Age: 62
End: 2022-09-19
Payer: COMMERCIAL

## 2022-09-19 DIAGNOSIS — E11.9 TYPE 2 DIABETES MELLITUS WITHOUT COMPLICATION, WITH LONG-TERM CURRENT USE OF INSULIN (H): Primary | ICD-10-CM

## 2022-09-19 DIAGNOSIS — Z78.9 TAKES DIETARY SUPPLEMENTS: ICD-10-CM

## 2022-09-19 DIAGNOSIS — I10 BENIGN ESSENTIAL HYPERTENSION: ICD-10-CM

## 2022-09-19 DIAGNOSIS — Z79.4 TYPE 2 DIABETES MELLITUS WITHOUT COMPLICATION, WITH LONG-TERM CURRENT USE OF INSULIN (H): Primary | ICD-10-CM

## 2022-09-19 DIAGNOSIS — E78.5 HYPERLIPIDEMIA LDL GOAL <100: ICD-10-CM

## 2022-09-19 PROCEDURE — 99207 PR NO CHARGE LOS: CPT | Performed by: PHARMACIST

## 2022-09-19 NOTE — PROGRESS NOTES
Medication Therapy Management (MTM) Encounter    ASSESSMENT:                            Medication Adherence/Access: No issues identified    Type 2 Diabetes: Patient is not meeting A1c goal of < 7%. Patient is meeting goal of >70% time in target with continuous glucose monitoring, although we do have limited data.  Plan in place to re-check A1c.  Patient may benefit from scanning Susannah more often.    Hypertension: Stable. Patient is meeting blood pressure goal of < 140/90mmHg.    Hyperlipidemia: Stable. Patient is on moderate intensity statin which is indicated based on 2019 ACC/AHA guidelines for lipid management. Patient due for lipid panel.     Supplements: Stable, supplements are likely not needed but she finds them effective and they likely aren't harmful.    PLAN:                            1.  Encouraged Flora to ensure she's scanning her continuous glucose monitor at least once every 8 hours.  2.  Will await A1c from later this week    Follow-up: Return in about 2 days (around 9/21/2022) for Lab Work.    SUBJECTIVE/OBJECTIVE:                          Flora Miranda is a 62 year old female called for a follow-up visit.  Today's visit is a follow-up MTM visit from 4/18/22.     Reason for visit: Diabetes follow-up.    Tobacco: She reports that she has been smoking. She has a 22.50 pack-year smoking history. She has never used smokeless tobacco.Nicotine/Tobacco Cessation Plan:   Information offered: Patient not interested at this time  Alcohol: not currently using    Medication Adherence/Access: no issues reported    Type 2 Diabetes:  Currently taking metformin 1000 mg twice daily, glipizide 10 mg twice daily, and Trulicity 1.5 mg weekly. She denies side effects of therapy.  Plan in place to re-check A1c in 2 days.  Blood sugar monitoring: Continuous Glucose Monitor. Ranges (per patient's glucose log):     Symptoms of low blood sugar? Sometimes has alarms indicating her blood sugar is in the high 60s, always  overnight around 3-4am, frequency varies, about 2x/month on average  Symptoms of high blood sugar? None  Eye exam: She reports having this done about 3 weeks ago - [facility] in [place]  Foot exam: due  Diet/Exercise: No changes since last visit  Aspirin: Taking 81mg daily and denies side effects  Statin: Yes: atorvastatin 20 mg daily   ACEi/ARB: Yes: lisinopril 10 mg daily.   Urine Albumin:   Lab Results   Component Value Date    UMALCR 35.71 (H) 08/10/2021      Lab Results   Component Value Date    A1C 7.6 05/11/2022    A1C 8.0 08/10/2021     Home weight during our call - 234 lbs  Wt Readings from Last 4 Encounters:   05/16/22 237 lb (107.5 kg)   12/28/21 254 lb (115.2 kg)   08/10/21 246 lb (111.6 kg)   04/27/21 250 lb (113.4 kg)      Hypertension: Current medications include lisinopril 10 mg daily.  Patient does not self-monitor blood pressure.  Patient reports no current medication side effects.  BP Readings from Last 3 Encounters:   05/16/22 132/84   12/28/21 130/78   08/10/21 112/77      Hyperlipidemia: Current therapy includes atorvastatin 20 mg daily.  Patient reports no significant myalgias or other side effects.  The ASCVD Risk score (Dimitrios PABLO Jr., et al., 2013) failed to calculate for the following reasons:    The valid total cholesterol range is 130 to 320 mg/dL     Recent Labs   Lab Test 05/11/22  1136   CHOL 103   HDL 36*   LDL 51   TRIG 82     Supplements:  Current supplements include: calcium/vitamin D 1 tablet daily, a daily multivitamin, Osteo Bi Flex 2 tablets daily, magnesium 800mg daily, Vitamin D daily 125mcg daily, potassium 396mg daily (product unknown, takes four 99mg tablets) and Melaluca Cell Wise 1 tablet daily (contains olive extract, grape seed extract, vitamin C, carotenoids, lycopene and tocopherols).  She finds these effective and prefers to continue taking.  Potassium   Date Value Ref Range Status   05/11/2022 4.1 3.4 - 5.3 mmol/L Final   04/27/2021 4.7 3.4 - 5.3 mmol/L Final      Comment:     Specimen slightly hemolyzed, potassium may be falsely elevated      Vitamin D Deficiency Screening Results:  Lab Results   Component Value Date    VITDT 58 05/11/2022     No results found for: MAG      Today's Vitals: There were no vitals taken for this visit.  ----------------    I spent 14 minutes with this patient today.  A copy of the visit note was provided to the patient's provider(s).    The patient was sent via [employer] a summary of these recommendations.     Christina JenkinsD, [facility]  Medication Therapy Management Provider  Pager: [phone]     Telemedicine Visit Details  Type of service:  Telephone visit  Start Time: 9:01 AM  End Time: 9:15 AM  Originating Location (patient location): [place]  Distant Location (provider location):  [facility]     Medication Therapy Recommendations  No medication therapy recommendations to display

## 2022-09-19 NOTE — PATIENT INSTRUCTIONS
"Recommendations from today's MTM visit:                                                    MTM (medication therapy management) is a service provided by a clinical pharmacist designed to help you get the most of out of your medicines.   Today we reviewed what your medicines are for, how to know if they are working, that your medicines are safe and how to make your medicine regimen as easy as possible.      Please make sure you're scanning your Susannah at least once every 8 hours    Follow-up: Return in about 2 days (around 9/21/2022) for Lab Work.    It was great speaking with you today.  I value your experience and would be very thankful for your time in providing feedback in our clinic survey. In the next few days, you may receive an email or text message from [employer] with a link to a survey related to your  clinical pharmacist.\"     To schedule another MTM appointment, please call the clinic directly or you may call the MTM scheduling line at [phone] or toll-free at [phone].     My Clinical Pharmacist's contact information:                                                      Please feel free to contact me with any questions or concerns you have.      Gema Figueredo, [place], [facility]  Medication Therapy Management Provider  Pager: [phone]    "

## 2022-09-21 ENCOUNTER — LAB (OUTPATIENT)
Dept: LAB | Facility: CLINIC | Age: 62
End: 2022-09-21
Payer: COMMERCIAL

## 2022-09-21 DIAGNOSIS — E11.9 TYPE 2 DIABETES MELLITUS WITHOUT COMPLICATION, WITH LONG-TERM CURRENT USE OF INSULIN (H): ICD-10-CM

## 2022-09-21 DIAGNOSIS — Z79.4 TYPE 2 DIABETES MELLITUS WITHOUT COMPLICATION, WITH LONG-TERM CURRENT USE OF INSULIN (H): ICD-10-CM

## 2022-09-21 LAB — HBA1C MFR BLD: 6.5 % (ref 0–5.6)

## 2022-09-21 PROCEDURE — 36415 COLL VENOUS BLD VENIPUNCTURE: CPT

## 2022-09-21 PROCEDURE — 83036 HEMOGLOBIN GLYCOSYLATED A1C: CPT

## 2022-10-04 DIAGNOSIS — Z79.4 TYPE 2 DIABETES MELLITUS WITHOUT COMPLICATION, WITH LONG-TERM CURRENT USE OF INSULIN (H): ICD-10-CM

## 2022-10-04 DIAGNOSIS — E11.9 TYPE 2 DIABETES MELLITUS WITHOUT COMPLICATION, WITH LONG-TERM CURRENT USE OF INSULIN (H): ICD-10-CM

## 2022-10-06 RX ORDER — LISINOPRIL 10 MG/1
TABLET ORAL
Qty: 90 TABLET | Refills: 1 | Status: SHIPPED | OUTPATIENT
Start: 2022-10-06 | End: 2023-02-28

## 2022-10-06 NOTE — TELEPHONE ENCOUNTER
BP Readings from Last 3 Encounters:   05/16/22 132/84   12/28/21 130/78   08/10/21 112/77     Creatinine   Date Value Ref Range Status   05/11/2022 0.70 0.52 - 1.04 mg/dL Final   04/27/2021 0.58 0.52 - 1.04 mg/dL Final     Refilled per [facility] protocol.  Emma Laguna RN

## 2022-10-30 DIAGNOSIS — Z79.4 TYPE 2 DIABETES MELLITUS WITHOUT COMPLICATION, WITH LONG-TERM CURRENT USE OF INSULIN (H): ICD-10-CM

## 2022-10-30 DIAGNOSIS — E11.9 TYPE 2 DIABETES MELLITUS WITHOUT COMPLICATION, WITH LONG-TERM CURRENT USE OF INSULIN (H): ICD-10-CM

## 2022-11-01 RX ORDER — GLIPIZIDE 10 MG/1
TABLET ORAL
Qty: 60 TABLET | Refills: 0 | Status: SHIPPED | OUTPATIENT
Start: 2022-11-01 | End: 2022-11-16

## 2022-11-13 DIAGNOSIS — E11.9 TYPE 2 DIABETES MELLITUS WITHOUT COMPLICATION, WITH LONG-TERM CURRENT USE OF INSULIN (H): ICD-10-CM

## 2022-11-13 DIAGNOSIS — Z79.4 TYPE 2 DIABETES MELLITUS WITHOUT COMPLICATION, WITH LONG-TERM CURRENT USE OF INSULIN (H): ICD-10-CM

## 2022-11-16 RX ORDER — ATORVASTATIN CALCIUM 20 MG/1
TABLET, FILM COATED ORAL
Qty: 90 TABLET | Refills: 0 | Status: SHIPPED | OUTPATIENT
Start: 2022-11-16 | End: 2023-02-06

## 2022-11-16 RX ORDER — GLIPIZIDE 10 MG/1
TABLET ORAL
Qty: 180 TABLET | Refills: 0 | Status: SHIPPED | OUTPATIENT
Start: 2022-11-16 | End: 2023-02-06

## 2022-11-16 NOTE — TELEPHONE ENCOUNTER
Prescription approved per [facility] Refill Protocol.  Ghazal García RN  [facility]

## 2022-11-28 DIAGNOSIS — E11.9 TYPE 2 DIABETES MELLITUS WITHOUT COMPLICATION, WITH LONG-TERM CURRENT USE OF INSULIN (H): ICD-10-CM

## 2022-11-28 DIAGNOSIS — Z79.4 TYPE 2 DIABETES MELLITUS WITHOUT COMPLICATION, WITH LONG-TERM CURRENT USE OF INSULIN (H): ICD-10-CM

## 2022-11-29 RX ORDER — ATORVASTATIN CALCIUM 20 MG/1
TABLET, FILM COATED ORAL
Qty: 90 TABLET | Refills: 0 | OUTPATIENT
Start: 2022-11-29

## 2022-11-29 NOTE — TELEPHONE ENCOUNTER
This refill request is a duplicate request, previously received or sent.  Sent denial notification to pharmacy.  Ghazal García, Triage RN  [facility]

## 2022-12-05 ENCOUNTER — TRANSFERRED RECORDS (OUTPATIENT)
Dept: HEALTH INFORMATION MANAGEMENT | Facility: CLINIC | Age: 62
End: 2022-12-05

## 2022-12-20 ENCOUNTER — E-VISIT (OUTPATIENT)
Dept: FAMILY MEDICINE | Facility: CLINIC | Age: 62
End: 2022-12-20
Payer: COMMERCIAL

## 2022-12-20 DIAGNOSIS — R09.81 NASAL CONGESTION: Primary | ICD-10-CM

## 2022-12-20 PROCEDURE — 99207 PR NON-BILLABLE SERV PER CHARTING: CPT | Performed by: PHYSICIAN ASSISTANT

## 2023-01-03 ENCOUNTER — TELEPHONE (OUTPATIENT)
Dept: FAMILY MEDICINE | Facility: CLINIC | Age: 63
End: 2023-01-03

## 2023-01-03 NOTE — TELEPHONE ENCOUNTER
Prior Authorization Retail Medication Request    Medication/Dose:  Trulicity 1.5mg  Previously Tried and Failed:  Metformin, glipizide  Rationale:  Remains on these medications, but blood sugar uncontrolled without Trulicity    Insurance Name:  [employer]  Insurance ID:  [id number]

## 2023-01-05 NOTE — TELEPHONE ENCOUNTER
Prior Authorization Not Needed per Insurance    Medication: Trulicity  Insurance Company: [employer] - Phone [phone] Fax [phone]  Expected CoPay:      Pharmacy Filling the Rx: [facility] [address]  Pharmacy Notified: Yes  Patient Notified: Yes  **Instructed pharmacy to notify patient when script is ready to /ship.**               31

## 2023-01-14 ENCOUNTER — HEALTH MAINTENANCE LETTER (OUTPATIENT)
Age: 63
End: 2023-01-14

## 2023-02-06 ENCOUNTER — OFFICE VISIT (OUTPATIENT)
Dept: FAMILY MEDICINE | Facility: CLINIC | Age: 63
End: 2023-02-06
Payer: COMMERCIAL

## 2023-02-06 VITALS
WEIGHT: 235.5 LBS | DIASTOLIC BLOOD PRESSURE: 86 MMHG | HEART RATE: 85 BPM | RESPIRATION RATE: 16 BRPM | TEMPERATURE: 97.9 F | OXYGEN SATURATION: 97 % | HEIGHT: 63 IN | SYSTOLIC BLOOD PRESSURE: 138 MMHG | BODY MASS INDEX: 41.73 KG/M2

## 2023-02-06 DIAGNOSIS — Z86.711 HISTORY OF PULMONARY EMBOLISM: ICD-10-CM

## 2023-02-06 DIAGNOSIS — Z01.818 PRE-OPERATIVE EXAMINATION: Primary | ICD-10-CM

## 2023-02-06 DIAGNOSIS — D58.2 ELEVATED HEMOGLOBIN (H): Chronic | ICD-10-CM

## 2023-02-06 DIAGNOSIS — E11.9 TYPE 2 DIABETES MELLITUS WITHOUT COMPLICATION, WITHOUT LONG-TERM CURRENT USE OF INSULIN (H): ICD-10-CM

## 2023-02-06 DIAGNOSIS — Z72.0 TOBACCO ABUSE: Chronic | ICD-10-CM

## 2023-02-06 DIAGNOSIS — E66.01 MORBID OBESITY (H): ICD-10-CM

## 2023-02-06 LAB
ANION GAP SERPL CALCULATED.3IONS-SCNC: 9 MMOL/L (ref 7–15)
BASOPHILS # BLD AUTO: 0 10E3/UL (ref 0–0.2)
BASOPHILS NFR BLD AUTO: 1 %
BUN SERPL-MCNC: 15 MG/DL (ref 8–23)
CALCIUM SERPL-MCNC: 9.9 MG/DL (ref 8.8–10.2)
CHLORIDE SERPL-SCNC: 101 MMOL/L (ref 98–107)
CREAT SERPL-MCNC: 0.62 MG/DL (ref 0.51–0.95)
DEPRECATED HCO3 PLAS-SCNC: 29 MMOL/L (ref 22–29)
EOSINOPHIL # BLD AUTO: 0.2 10E3/UL (ref 0–0.7)
EOSINOPHIL NFR BLD AUTO: 3 %
ERYTHROCYTE [DISTWIDTH] IN BLOOD BY AUTOMATED COUNT: 12 % (ref 10–15)
GFR SERPL CREATININE-BSD FRML MDRD: >90 ML/MIN/1.73M2
GLUCOSE SERPL-MCNC: 100 MG/DL (ref 70–99)
HBA1C MFR BLD: 7 % (ref 0–5.6)
HCT VFR BLD AUTO: 48.1 % (ref 35–47)
HGB BLD-MCNC: 16.2 G/DL (ref 11.7–15.7)
IMM GRANULOCYTES # BLD: 0 10E3/UL
IMM GRANULOCYTES NFR BLD: 0 %
LYMPHOCYTES # BLD AUTO: 2.4 10E3/UL (ref 0.8–5.3)
LYMPHOCYTES NFR BLD AUTO: 29 %
MCH RBC QN AUTO: 32.1 PG (ref 26.5–33)
MCHC RBC AUTO-ENTMCNC: 33.7 G/DL (ref 31.5–36.5)
MCV RBC AUTO: 95 FL (ref 78–100)
MONOCYTES # BLD AUTO: 0.5 10E3/UL (ref 0–1.3)
MONOCYTES NFR BLD AUTO: 6 %
NEUTROPHILS # BLD AUTO: 5.1 10E3/UL (ref 1.6–8.3)
NEUTROPHILS NFR BLD AUTO: 62 %
PLATELET # BLD AUTO: 221 10E3/UL (ref 150–450)
POTASSIUM SERPL-SCNC: 4.6 MMOL/L (ref 3.4–5.3)
RBC # BLD AUTO: 5.05 10E6/UL (ref 3.8–5.2)
SODIUM SERPL-SCNC: 139 MMOL/L (ref 136–145)
WBC # BLD AUTO: 8.3 10E3/UL (ref 4–11)

## 2023-02-06 PROCEDURE — 99215 OFFICE O/P EST HI 40 MIN: CPT | Performed by: INTERNAL MEDICINE

## 2023-02-06 PROCEDURE — 93000 ELECTROCARDIOGRAM COMPLETE: CPT | Performed by: INTERNAL MEDICINE

## 2023-02-06 PROCEDURE — 36415 COLL VENOUS BLD VENIPUNCTURE: CPT | Performed by: INTERNAL MEDICINE

## 2023-02-06 PROCEDURE — 83036 HEMOGLOBIN GLYCOSYLATED A1C: CPT | Performed by: INTERNAL MEDICINE

## 2023-02-06 PROCEDURE — 85025 COMPLETE CBC W/AUTO DIFF WBC: CPT | Performed by: INTERNAL MEDICINE

## 2023-02-06 PROCEDURE — 80048 BASIC METABOLIC PNL TOTAL CA: CPT | Performed by: INTERNAL MEDICINE

## 2023-02-06 RX ORDER — GLIPIZIDE 10 MG/1
10 TABLET ORAL
Qty: 180 TABLET | Refills: 0 | Status: SHIPPED | OUTPATIENT
Start: 2023-02-06 | End: 2023-02-28

## 2023-02-06 RX ORDER — ATORVASTATIN CALCIUM 20 MG/1
20 TABLET, FILM COATED ORAL DAILY
Qty: 90 TABLET | Refills: 1 | Status: SHIPPED | OUTPATIENT
Start: 2023-02-06 | End: 2023-07-31

## 2023-02-06 ASSESSMENT — PAIN SCALES - GENERAL: PAINLEVEL: MODERATE PAIN (4)

## 2023-02-06 NOTE — PROGRESS NOTES
[facility]  [address] [place] [address], [address]  [facility] [address]  Phone: [phone]  Primary Provider: Lilly Jacob  Pre-op Performing Provider: LILLY JACOB      PREOPERATIVE EVALUATION:  Today's date: 2/6/2023    Flora Miranda is a 62 year old female who presents for a preoperative evaluation.    Surgical Information:  Surgery/Procedure: Knee Replacement   Surgery Location: [facility]  Surgeon: Dr. Rodrigo Ingram  Surgery Date: 03/03/2023  Time of Surgery: TBD  Where patient plans to recover: At home with family  Fax number for surgical facility: [phone]    Type of Anesthesia Anticipated: to be determined    Assessment & Plan     The proposed surgical procedure is considered INTERMEDIATE risk in MODERATE/HIGH risk patient:    Pre-operative examination  Type 2 diabetes mellitus without complication, without long-term current use of insulin (H)  Tobacco abuse  Morbid obesity (H)  History of pulmonary embolism (remote secondary to OCP)  Patient has several risk factors: morbid obesity, active smoker, diabetic for which she is at increased risk of surgical complications including poor wound healing, infections and VTE. I discussed this with her today. She states understanding.   We updated EKG and labs today which do not show acute abnormalities. Her diabetes is controlled.  She was encouraged to closely follow-up with me for her routine health and best outcomes at least every 3- 6 months due to chronic conditions. She is overdue for several preventative health measures which we discussed last year and again today (mammo/colon ca screening/pap/etc). Follow-up in three months for close recheck    Hold ASA 7 days prior to surgery including morning of surgery  Hold Trulicity morning of surgery  Hold Metformin Morning of surgery  Hold glipizide morning of surgery      - metFORMIN (GLUCOPHAGE) 1000 MG tablet  Dispense: 180 tablet; Refill: 0  - glipiZIDE (GLUCOTROL) 10 MG tablet   Dispense: 180 tablet; Refill: 0  - atorvastatin (LIPITOR) 20 MG tablet  Dispense: 90 tablet; Refill: 1  - CBC with Platelets & Differential  - Basic metabolic panel  (Ca, Cl, CO2, Creat, Gluc, K, Na, BUN)  - Hemoglobin A1c  - EKG 12-lead complete w/read - Clinics      RECOMMENDATION:  APPROVAL GIVEN to proceed with proposed procedure, without further diagnostic evaluation.      > 45 min spent on the date of this encounter doing chart review, documentation, history/exam, and further activities as noted above      Subjective     HPI related to upcoming procedure:     Denies cp/sob at rest or with exertion. Denies f/c. Active smoker-1/2 pack daily.       Preop Questions 2/6/2023   1. Have you ever had a heart attack or stroke? No   2. Have you ever had surgery on your heart or blood vessels, such as a stent placement, a coronary artery bypass, or surgery on an artery in your head, neck, heart, or legs? No   3. Do you have chest pain with activity? No   4. Do you have a history of  heart failure? No   5. Do you currently have a cold, bronchitis or symptoms of other infection? No   6. Do you have a cough, shortness of breath, or wheezing? No   7. Do you or anyone in your family have previous history of blood clots? YES - personal, remote; PE secondary to OCP, treated with AC and has remained off hormones since   8. Do you or does anyone in your family have a serious bleeding problem such as prolonged bleeding following surgeries or cuts? No   9. Have you ever had problems with anemia or been told to take iron pills? YES    10. Have you had any abnormal blood loss such as black, tarry or bloody stools, or abnormal vaginal bleeding? No   11. Have you ever had a blood transfusion? YES    11a. Have you ever had a transfusion reaction? No   12. Are you willing to have a blood transfusion if it is medically needed before, during, or after your surgery? Yes   13. Have you or any of your relatives ever had problems with  anesthesia? No   14. Do you have sleep apnea, excessive snoring or daytime drowsiness? No   15. Do you have any artifical heart valves or other implanted medical devices like a pacemaker, defibrillator, or continuous glucose monitor? No   16. Do you have artificial joints? No   17. Are you allergic to latex? No     Health Care Directive:  Patient does not have a Health Care Directive or Living Will: Discussed advance care planning with patient; however, patient declined at this time.    Review of Systems  Constitutional, neuro, ENT, endocrine, pulmonary, cardiac, gastrointestinal, genitourinary, musculoskeletal, integument and psychiatric systems are negative, except as otherwise noted.    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  No past surgical history on file.  Current Outpatient Medications   Medication Sig Dispense Refill     aspirin (ASA) 81 MG EC tablet Take 81 mg by mouth       atorvastatin (LIPITOR) 20 MG tablet Take 1 tablet (20 mg) by mouth daily 90 tablet 1     calcium carbonate 600 mg-vitamin D 400 units (CALTRATE) 600-400 MG-UNIT per tablet Take 1 tablet by mouth daily       Continuous Blood Gluc Sensor (FREESTYLE MICAH 2 SENSOR) MISC 1 each every 14 days 1 each every 14 days. Change every 14 days. 2 each 11     dulaglutide (TRULICITY) 1.5 MG/0.5ML pen Inject 1.5 mg Subcutaneous every 7 days 6 mL 2     glipiZIDE (GLUCOTROL) 10 MG tablet Take 1 tablet (10 mg) by mouth 2 times daily (before meals) 180 tablet 0     lisinopril (ZESTRIL) 10 MG  "tablet Take 1 tablet by mouth once daily 90 tablet 1     magnesium 500 MG TABS Take 800 mg by mouth daily       metFORMIN (GLUCOPHAGE) 1000 MG tablet Take 1 tablet (1,000 mg) by mouth 2 times daily (with meals) 180 tablet 0     Misc Natural Products (OSTEO BI-FLEX ADV DOUBLE ST) TABS Take 2 tablets by mouth daily       Multiple Vitamins-Minerals (MULTIVITAMIN ADULTS PO) Take 1 tablet by mouth daily       Nutritional Supplements (VITAMIN D BOOSTER PO) Take 125 mcg by mouth daily       POTASSIUM PO Take 396 mg by mouth daily       UNABLE TO FIND MEDICATION NAME: Melaluca Cell Wise 1 tablet daily         No Known Allergies     Social History     Tobacco Use     Smoking status: Every Day     Packs/day: 0.50     Years: 45.00     Pack years: 22.50     Types: Cigarettes     Smokeless tobacco: Never   Substance Use Topics     Alcohol use: Yes     Comment: rarely       History   Drug Use Not on file         Objective     /86 (BP Location: Left arm, Patient Position: Sitting, Cuff Size: Adult Large)   Pulse 85   Temp 97.9  F (36.6  C)   Resp 16   Ht 1.6 m (5' 3\")   Wt 106.8 kg (235 lb 8 oz)   SpO2 97%   BMI 41.72 kg/m      Physical Exam    GENERAL APPEARANCE: AAOx3, no distress. Well developed.    RESP: Lungs CTA bilaterally. No w/r/r. No distress     CV: RRR, S1/S2 present. No m/r/c.     ABDOMEN:  soft, nontender, no distention. No rebound or guarding.     EXT: No c/c/e in lower extremities b/l. No rashes or deformities noted.    PSYCH: appropriate mood and affect.           Recent Labs   Lab Test 09/21/22  1115 05/11/22  1136 08/10/21  1425 04/27/21  1730   HGB  --   --   --  15.5   PLT  --   --   --  240   NA  --  139  --  135   POTASSIUM  --  4.1  --  4.7   CR  --  0.70  --  0.58   A1C 6.5* 7.6*   < >  --     < > = values in this interval not displayed.        Diagnostics:  Labs pending at this time.  Results will be reviewed when available.   EKG obtained: no significant abnormalities    Revised Cardiac Risk " Index (RCRI):  The patient has the following serious cardiovascular risks for perioperative complications:   - No serious cardiac risks = 0 points     RCRI Interpretation: 0 points: Class I (very low risk - 0.4% complication rate)           Signed Electronically by: Nicki Renee DO  Copy of this evaluation report is provided to requesting physician.

## 2023-02-06 NOTE — PATIENT INSTRUCTIONS
Hold ASA 7 days prior to surgery including morning of surgery  Hold Trulicity morning of surgery  Hold Metformin Morning of surgery  Hold glipizide morning of surgery

## 2023-02-13 DIAGNOSIS — Z79.4 TYPE 2 DIABETES MELLITUS WITHOUT COMPLICATION, WITH LONG-TERM CURRENT USE OF INSULIN (H): ICD-10-CM

## 2023-02-13 DIAGNOSIS — E11.9 TYPE 2 DIABETES MELLITUS WITHOUT COMPLICATION, WITH LONG-TERM CURRENT USE OF INSULIN (H): ICD-10-CM

## 2023-02-24 ENCOUNTER — MYC MEDICAL ADVICE (OUTPATIENT)
Dept: FAMILY MEDICINE | Facility: CLINIC | Age: 63
End: 2023-02-24
Payer: COMMERCIAL

## 2023-02-24 DIAGNOSIS — E11.9 TYPE 2 DIABETES MELLITUS WITHOUT COMPLICATION, WITHOUT LONG-TERM CURRENT USE OF INSULIN (H): Primary | ICD-10-CM

## 2023-02-24 DIAGNOSIS — E11.65 UNCONTROLLED TYPE 2 DIABETES MELLITUS WITH HYPERGLYCEMIA (H): ICD-10-CM

## 2023-02-24 RX ORDER — PROCHLORPERAZINE 25 MG/1
1 SUPPOSITORY RECTAL
Qty: 3 EACH | Refills: 5 | Status: SHIPPED | OUTPATIENT
Start: 2023-02-24 | End: 2023-04-11

## 2023-02-24 RX ORDER — PROCHLORPERAZINE 25 MG/1
1 SUPPOSITORY RECTAL ONCE
Qty: 1 EACH | Refills: 0 | Status: SHIPPED | OUTPATIENT
Start: 2023-02-24 | End: 2023-02-24

## 2023-02-24 RX ORDER — PROCHLORPERAZINE 25 MG/1
1 SUPPOSITORY RECTAL
Qty: 1 EACH | Refills: 1 | Status: SHIPPED | OUTPATIENT
Start: 2023-02-24 | End: 2023-04-11

## 2023-02-24 NOTE — TELEPHONE ENCOUNTER
PCP see below     Pt asking for Dexcom G6 supplies and prior auth     Please advise - Rx would need to be submitted before PA could be started     Adeola JETER, Triage RN  [facility]

## 2023-02-28 ENCOUNTER — MYC MEDICAL ADVICE (OUTPATIENT)
Dept: FAMILY MEDICINE | Facility: CLINIC | Age: 63
End: 2023-02-28
Payer: COMMERCIAL

## 2023-02-28 ENCOUNTER — MYC REFILL (OUTPATIENT)
Dept: FAMILY MEDICINE | Facility: CLINIC | Age: 63
End: 2023-02-28
Payer: COMMERCIAL

## 2023-02-28 DIAGNOSIS — E11.9 TYPE 2 DIABETES MELLITUS WITHOUT COMPLICATION, WITH LONG-TERM CURRENT USE OF INSULIN (H): ICD-10-CM

## 2023-02-28 DIAGNOSIS — Z79.4 TYPE 2 DIABETES MELLITUS WITHOUT COMPLICATION, WITH LONG-TERM CURRENT USE OF INSULIN (H): ICD-10-CM

## 2023-02-28 DIAGNOSIS — E11.9 TYPE 2 DIABETES MELLITUS WITHOUT COMPLICATION, WITHOUT LONG-TERM CURRENT USE OF INSULIN (H): ICD-10-CM

## 2023-02-28 RX ORDER — LISINOPRIL 10 MG/1
10 TABLET ORAL DAILY
Qty: 90 TABLET | Refills: 3 | Status: SHIPPED | OUTPATIENT
Start: 2023-02-28

## 2023-02-28 RX ORDER — GLIPIZIDE 10 MG/1
10 TABLET ORAL
Qty: 180 TABLET | Refills: 0 | Status: SHIPPED | OUTPATIENT
Start: 2023-02-28 | End: 2023-04-11

## 2023-02-28 NOTE — TELEPHONE ENCOUNTER
Metformin and Glipizide were approved today already     Jelena Gordon RN  [facility]

## 2023-03-03 ENCOUNTER — TRANSFERRED RECORDS (OUTPATIENT)
Dept: HEALTH INFORMATION MANAGEMENT | Facility: CLINIC | Age: 63
End: 2023-03-03

## 2023-03-13 DIAGNOSIS — Z79.4 TYPE 2 DIABETES MELLITUS WITHOUT COMPLICATION, WITH LONG-TERM CURRENT USE OF INSULIN (H): ICD-10-CM

## 2023-03-13 DIAGNOSIS — E11.9 TYPE 2 DIABETES MELLITUS WITHOUT COMPLICATION, WITH LONG-TERM CURRENT USE OF INSULIN (H): ICD-10-CM

## 2023-03-14 RX ORDER — DULAGLUTIDE 1.5 MG/.5ML
INJECTION, SOLUTION SUBCUTANEOUS
Qty: 12 ML | Refills: 0 | Status: SHIPPED | OUTPATIENT
Start: 2023-03-14 | End: 2023-06-08

## 2023-03-20 ENCOUNTER — TRANSFERRED RECORDS (OUTPATIENT)
Dept: HEALTH INFORMATION MANAGEMENT | Facility: CLINIC | Age: 63
End: 2023-03-20

## 2023-03-21 ENCOUNTER — DOCUMENTATION ONLY (OUTPATIENT)
Dept: LAB | Facility: CLINIC | Age: 63
End: 2023-03-21

## 2023-03-28 ENCOUNTER — LAB (OUTPATIENT)
Dept: LAB | Facility: CLINIC | Age: 63
End: 2023-03-28
Payer: COMMERCIAL

## 2023-03-28 DIAGNOSIS — E11.65 UNCONTROLLED TYPE 2 DIABETES MELLITUS WITH HYPERGLYCEMIA (H): ICD-10-CM

## 2023-03-28 DIAGNOSIS — E11.65 UNCONTROLLED TYPE 2 DIABETES MELLITUS WITH HYPERGLYCEMIA (H): Primary | ICD-10-CM

## 2023-03-28 LAB
CHOLEST SERPL-MCNC: 114 MG/DL
HDLC SERPL-MCNC: 38 MG/DL
LDLC SERPL CALC-MCNC: 54 MG/DL
NONHDLC SERPL-MCNC: 76 MG/DL
TRIGL SERPL-MCNC: 109 MG/DL

## 2023-03-28 PROCEDURE — 36415 COLL VENOUS BLD VENIPUNCTURE: CPT

## 2023-03-28 PROCEDURE — 80061 LIPID PANEL: CPT

## 2023-04-11 ENCOUNTER — VIRTUAL VISIT (OUTPATIENT)
Dept: PHARMACY | Facility: CLINIC | Age: 63
End: 2023-04-11
Payer: COMMERCIAL

## 2023-04-11 DIAGNOSIS — Z78.9 TAKES DIETARY SUPPLEMENTS: ICD-10-CM

## 2023-04-11 DIAGNOSIS — Z96.659 STATUS POST KNEE REPLACEMENT, UNSPECIFIED LATERALITY: Primary | ICD-10-CM

## 2023-04-11 DIAGNOSIS — I10 BENIGN ESSENTIAL HYPERTENSION: ICD-10-CM

## 2023-04-11 DIAGNOSIS — Z72.0 TOBACCO ABUSE: ICD-10-CM

## 2023-04-11 DIAGNOSIS — E11.9 TYPE 2 DIABETES MELLITUS WITHOUT COMPLICATION, WITHOUT LONG-TERM CURRENT USE OF INSULIN (H): ICD-10-CM

## 2023-04-11 DIAGNOSIS — E78.5 HYPERLIPIDEMIA LDL GOAL <100: ICD-10-CM

## 2023-04-11 PROCEDURE — 99207 PR NO CHARGE LOS: CPT | Performed by: PHARMACIST

## 2023-04-11 RX ORDER — ACETAMINOPHEN 500 MG
1000 TABLET ORAL 3 TIMES DAILY PRN
COMMUNITY
End: 2023-08-29

## 2023-04-11 RX ORDER — GLIPIZIDE 10 MG/1
10 TABLET ORAL
Qty: 180 TABLET | Refills: 0 | Status: SHIPPED | OUTPATIENT
Start: 2023-04-11 | End: 2023-11-22

## 2023-04-11 RX ORDER — VARENICLINE TARTRATE 1 MG/1
1 TABLET, FILM COATED ORAL 2 TIMES DAILY
Qty: 60 TABLET | Refills: 2 | Status: SHIPPED | OUTPATIENT
Start: 2023-04-11 | End: 2023-07-31

## 2023-04-11 NOTE — PROGRESS NOTES
Medication Therapy Management (MTM) Encounter    ASSESSMENT:                            Medication Adherence/Access: No issues identified    S/p Knee Replacement: Stable.    Smoking cessation: Patient is ready to quit using tobacco.  Based on patient preferences and history, patient would benefit from Chantix, one week prior to quit date.     Type 2 Diabetes: Patient is not meeting A1c goal of < 7% (is exactly 7%).  Will benefit from resuming use of continuous glucose monitor.    Hypertension: Patient is not meeting blood pressure goal of < 130/80mmHg, is meeting less aggressive goal of <140/90mmHg.  Will continue to monitor for now, could consider increasing lisinopril dose slightly, not discussed today.    Hyperlipidemia: Stable. Patient is on moderate intensity statin which is indicated based on 2019 ACC/AHA guidelines for lipid management and LDL is at goal <70mg/dL per 2023 ADA Standards of Care.    Supplements: Stable    PLAN:                            1.  Rx sent to her pharmacy for Susannah 2 reader, also refilled sensors.  2.  Started Chantix - will start with starting pack, then continuing pack after 1 month.    Future considerations - increase lisinopril dose    Follow-up: Return in about 3 months (around 7/11/2023) for Follow-up Medication Review.    SUBJECTIVE/OBJECTIVE:                          Flora Miranda is a 63 year old female called for a follow-up visit.  Today's visit is a follow-up MTM visit from 9/19/2022.     Reason for visit: Routine follow-up.    Tobacco: She reports that she has been smoking. She has a 22.50 pack-year smoking history. She has never used smokeless tobacco.Nicotine/Tobacco Cessation Plan:   See below  Alcohol: not currently using    Medication Adherence/Access: no issues reported    S/p Knee Replacement:  Patient underwent knee replacement surgery in March.  She reports this went well, she's working with physical therapy and will be completing this soon.  She's currently only  taking acetaminophen 1000mg 1-2 times per day and feels pain is well controlled.  No side effects of therapy reported.    Smoking cessation:  Flora has cut down on tobacco use, is interested in trying Chantix - she's tried this in the past and tolerated fine.  She's currently smoking about 1/2 PPD.    Type 2 Diabetes:  Currently taking metformin 1000 mg twice daily, glipizide 10 mg twice daily, and Trulicity 1.5 mg weekly. She denies side effects of therapy.   Blood sugar monitoring: None - she was using Freestyle Susannah 2, but she got a new phone and this was no longer compatible.  She requested changing to Dexcom G6, but this was too expensive so she didn't start using this.  She'd like to resume Susannah 2 but use the reader instead of her phone.  Symptoms of low blood sugar? None  Symptoms of high blood sugar? None  Eye exam: up to date  Foot exam: up to date  Diet/Exercise: No changes since last visit  Aspirin: Taking 81mg daily and denies side effects  Statin: Yes: atorvastatin 20 mg daily   ACEi/ARB: Yes: lisinopril 10 mg daily.   Urine Albumin:   Lab Results   Component Value Date    UMALCR 35.71 (H) 08/10/2021      Lab Results   Component Value Date    A1C 7.0 02/06/2023    A1C 6.5 09/21/2022    A1C 7.6 05/11/2022    A1C 8.0 08/10/2021     GFR Estimate   Date Value Ref Range Status   02/06/2023 >90 >60 mL/min/1.73m2 Final     Comment:     eGFR calculated using 2021 CKD-EPI equation.   04/27/2021 >90 >60 mL/min/[1.73_m2] Final     Comment:     Non  GFR Calc  Starting 12/18/2018, serum creatinine based estimated GFR (eGFR) will be   calculated using the Chronic Kidney Disease Epidemiology Collaboration   (CKD-EPI) equation.       Wt Readings from Last 4 Encounters:   02/06/23 235 lb 8 oz (106.8 kg)   05/16/22 237 lb (107.5 kg)   12/28/21 254 lb (115.2 kg)   08/10/21 246 lb (111.6 kg)       Hypertension: Current medications include lisinopril 10 mg daily.  Patient does not self-monitor blood  pressure.  Patient reports no current medication side effects.  BP Readings from Last 3 Encounters:   02/06/23 138/86   05/16/22 132/84   12/28/21 130/78       Hyperlipidemia: Current therapy includes atorvastatin 20 mg daily.  Patient reports no significant myalgias or other side effects.  The ASCVD Risk score (Frank MARRERO, et al., 2019) failed to calculate for the following reasons:    The valid total cholesterol range is 130 to 320 mg/dL     Recent Labs   Lab Test 03/28/23  0951 05/11/22  1136   CHOL 114 103   HDL 38* 36*   LDL 54 51   TRIG 109 82     Supplements:  Current supplements include: calcium/vitamin D 1 tablet daily, a daily multivitamin, magnesium 500mg daily, Vitamin D daily 125mcg daily and Melaluca Cell Wise 1 tablet daily (contains olive extract, grape seed extract, vitamin C, carotenoids, lycopene and tocopherols).  She's reduced magnesium dose and stopped OsteoBiFlex and potassium since our last visit.  She finds current supplements effective and prefers to continue taking.  Potassium   Date Value Ref Range Status   02/06/2023 4.6 3.4 - 5.3 mmol/L Final   05/11/2022 4.1 3.4 - 5.3 mmol/L Final   04/27/2021 4.7 3.4 - 5.3 mmol/L Final     Comment:     Specimen slightly hemolyzed, potassium may be falsely elevated      Vitamin D Deficiency Screening Results:  Lab Results   Component Value Date    VITDT 58 05/11/2022     Today's Vitals: There were no vitals taken for this visit.  ----------------    I spent 7 minutes with this patient today. All changes were made via collaborative practice agreement with Dr. Renee. A copy of the visit note was provided to the patient's provider(s).    A summary of these recommendations was sent via [employer].    Gema Figueredo, PharmD, BCACP  Medication Therapy Management Provider  Pager: [phone]     Telemedicine Visit Details  Type of service:  Telephone visit  Start Time: 8:30 AM  End Time: 8:37 AM     Medication Therapy Recommendations  Tobacco abuse    Rationale:  Untreated condition - Needs additional medication therapy - Indication   Recommendation: Start Medication - Chantix Starting Month Raúl 0.5 MG X 11 & 1 MG X 42 Tbpk   Status: Accepted per CPA         Type 2 diabetes mellitus without complication (H)    Current Medication: metFORMIN (GLUCOPHAGE) 1000 MG tablet   Rationale: Medication requires monitoring - Needs additional monitoring - Effectiveness   Recommendation: Self-Monitoring - FreeStyle Susannah 2 [place]ie   Status: Accepted per CPA

## 2023-04-11 NOTE — PATIENT INSTRUCTIONS
"Recommendations from today's MTM visit:                                                    MTM (medication therapy management) is a service provided by a clinical pharmacist designed to help you get the most of out of your medicines.   Today we reviewed what your medicines are for, how to know if they are working, that your medicines are safe and how to make your medicine regimen as easy as possible.      I sent a prescription to your pharmacy for the Susannah 2 reader, also refilled the sensors.  I also refilled glipizide.  I sent a prescription to your pharmacy for Chantix starting and continuing packs.  Start with the starting pack, after 1 month you'll transition to the continuing pack.  Let me know if you have any questions/concerns.    Follow-up: Return in about 3 months (around 7/11/2023) for Follow-up Medication Review.    It was great speaking with you today.  I value your experience and would be very thankful for your time in providing feedback in our clinic survey. In the next few days, you may receive an email or text message from [employer] with a link to a survey related to your  clinical pharmacist.\"     To schedule another MTM appointment, please call the clinic directly or you may call the MTM scheduling line at [phone] or toll-free at [phone].     My Clinical Pharmacist's contact information:                                                      Please feel free to contact me with any questions or concerns you have.      Gema Figueredo, [place], [facility]  Medication Therapy Management Provider  Pager: [phone]    "

## 2023-04-17 ENCOUNTER — TRANSFERRED RECORDS (OUTPATIENT)
Dept: HEALTH INFORMATION MANAGEMENT | Facility: CLINIC | Age: 63
End: 2023-04-17
Payer: COMMERCIAL

## 2023-06-02 ENCOUNTER — HEALTH MAINTENANCE LETTER (OUTPATIENT)
Age: 63
End: 2023-06-02

## 2023-06-08 DIAGNOSIS — Z79.4 TYPE 2 DIABETES MELLITUS WITHOUT COMPLICATION, WITH LONG-TERM CURRENT USE OF INSULIN (H): ICD-10-CM

## 2023-06-08 DIAGNOSIS — E11.9 TYPE 2 DIABETES MELLITUS WITHOUT COMPLICATION, WITH LONG-TERM CURRENT USE OF INSULIN (H): ICD-10-CM

## 2023-06-08 RX ORDER — DULAGLUTIDE 1.5 MG/.5ML
INJECTION, SOLUTION SUBCUTANEOUS
Qty: 6 ML | Refills: 0 | Status: SHIPPED | OUTPATIENT
Start: 2023-06-08 | End: 2023-08-29

## 2023-06-08 NOTE — TELEPHONE ENCOUNTER
Prescription approved per [facility] Refill Protocol.  Lashanda Leyva, RN  [facility] Triage Nurse

## 2023-07-31 ENCOUNTER — VIRTUAL VISIT (OUTPATIENT)
Dept: PHARMACY | Facility: CLINIC | Age: 63
End: 2023-07-31
Payer: COMMERCIAL

## 2023-07-31 DIAGNOSIS — E11.9 TYPE 2 DIABETES MELLITUS WITHOUT COMPLICATION, WITHOUT LONG-TERM CURRENT USE OF INSULIN (H): Primary | ICD-10-CM

## 2023-07-31 DIAGNOSIS — I10 HTN (HYPERTENSION), BENIGN: ICD-10-CM

## 2023-07-31 DIAGNOSIS — Z72.0 TOBACCO ABUSE: ICD-10-CM

## 2023-07-31 PROCEDURE — 99207 PR NO CHARGE LOS: CPT | Performed by: PHARMACIST

## 2023-07-31 RX ORDER — ATORVASTATIN CALCIUM 20 MG/1
20 TABLET, FILM COATED ORAL DAILY
Qty: 90 TABLET | Refills: 0 | Status: SHIPPED | OUTPATIENT
Start: 2023-07-31 | End: 2023-11-22

## 2023-07-31 RX ORDER — VARENICLINE TARTRATE 1 MG/1
1 TABLET, FILM COATED ORAL 2 TIMES DAILY
Qty: 60 TABLET | Refills: 3 | Status: SHIPPED | OUTPATIENT
Start: 2023-07-31

## 2023-07-31 NOTE — PROGRESS NOTES
Medication Therapy Management (MTM) Encounter    ASSESSMENT:                            Medication Adherence/Access: No issues identified    Type 2 Diabetes:    A1c due. We discussed unfortunately that it may be logistically difficult to restart Susannah or switch to Dexcom for much longer as she turns 64 soon and is not insulin dependent- we will start with the A1c check and then she could consider with Gema Figueredo trying to get on a less hypoglycemia-risky regimen such as higher dose of Trulicity to remove glipizide in order to make regular testing less of a need.    Smoking cessation:    Stable. Patient congratulated and sent 4 more months Chantix.    Hypertension:   Stable. BP at looser goal <140/90 and recently quit smoking- continue to monitor.    Hyperlipidemia:   Stable.    PLAN:                            1. I will send Chantix for another 4 months. Absolutely fabulous work in quitting smoking!    2. Schedule a lab only visit to get your A1c (or just get it with Dr. Renee if you are able to get in in the next 2-4 weeks). You do NOT need to fast.    3. The GoLo plan is effective but per their internal studies it appears that the most effective portion of the plan is the diet and exercise portion. You could consider looking at that part of the plan before deciding wether this is worth your time.     4. Schedule with Dr. Renee for your 6 month follow up.    Follow-up: Return in 4 weeks (on 8/28/2023) for phone visit.    SUBJECTIVE/OBJECTIVE:                          Flora Miranda is a 63 year old female called for a follow-up visit from 4/11/23.  Patient usually sees Gema Figueredo PharmD but scheduled with me in error- would still like to meet with me and then get back on Gema's schedule.     Reason for visit: Med recheck.    Allergies/ADRs: None  Past Medical History: Reviewed in chart  Tobacco: She reports that she quit smoking about 3 weeks ago. Her smoking use included cigarettes. She has a 22.50 pack-year  "smoking history. She has never used smokeless tobacco.  Alcohol: Less than 1 beverages / week    Medication Adherence/Access: no issues reported    Type 2 Diabetes:    Metformin 1000mg twice daily  Glipizide 10mg twice daily  Trulicity 1.5mg weekly  Aspirin 81mg daily    Patient is not experiencing side effects.  Blood sugar monitoring: Got Pixel 7 Pro and this is not compatible with her Susannah so she stopped (hated the separate  due to strange readings and alarms overnight) and is not using finger sticks either.  Current diabetes symptoms: none   Diet/Exercise: Wondering about GoLo supplement safety and benefits.  Lab Results   Component Value Date    UMALCR 35.71 (H) 08/10/2021      Lab Results   Component Value Date    A1C 7.0 (H) 02/06/2023     Smoking cessation:    Chantix 1mg twice daily (ran out 2 days ago)    Patient reports things going well.  She has not had a cigarette in 3 weeks- quit with her boyfriend at the same time. She's wondering if she can stay on Chantix a little longer as she's feeling the \"edge\" come back these past two days.    Hypertension:   Lisinopril 10mg daily    Patient reports no current medication side effects.  Patient does not self-monitor blood pressure.    BP Readings from Last 3 Encounters:   02/06/23 138/86   05/16/22 132/84   12/28/21 130/78     Pulse Readings from Last 3 Encounters:   02/06/23 85   05/16/22 83   12/28/21 89     Hyperlipidemia:   atorvastatin 20mg daily (wants refilled)    Patient reports no significant myalgias or other side effects.    Recent Labs   Lab Test 03/28/23  0951 05/11/22  1136   CHOL 114 103   HDL 38* 36*   LDL 54 51   TRIG 109 82     Last Comprehensive Metabolic Panel:  Lab Results   Component Value Date     02/06/2023    POTASSIUM 4.6 02/06/2023    CHLORIDE 101 02/06/2023    CO2 29 02/06/2023    ANIONGAP 9 02/06/2023     (H) 02/06/2023    BUN 15.0 02/06/2023    CR 0.62 02/06/2023    GFRESTIMATED >90 02/06/2023    SONJA 9.9 " 02/06/2023     I spent 27 minutes with this patient today. All changes were made via collaborative practice agreement with Dr. Renee. A copy of the visit note was provided to the patient's provider(s).    A summary of these recommendations was sent via [employer].    Gema Morales PharmD, [facility]  Medication Therapy Management Provider  Phone: [phone]  [email or web address]    Telemedicine Visit Details  Type of service:  Telephone visit  Start Time: 9:01 AM  End Time: 9:28 AM     Medication Therapy Recommendations  No medication therapy recommendations to display

## 2023-07-31 NOTE — PATIENT INSTRUCTIONS
"Recommendations from today's MTM visit:                                                       1. I will send Chantix for another 4 months. Absolutely fabulous work in quitting smoking!    2. Schedule a lab only visit to get your A1c (or just get it with Dr. Renee if you are able to get in in the next 2-4 weeks). You do NOT need to fast.    3. The GoLo plan is effective but per their internal studies it appears that the most effective portion of the plan is the diet and exercise portion. You could consider looking at that part of the plan before deciding wether this is worth your time.     4. Schedule with Dr. Renee for your 6 month follow up.    Follow-up: Return in 4 weeks (on 8/28/2023) for phone visit.    It was great speaking with you today.  I value your experience and would be very thankful for your time in providing feedback in our clinic survey. In the next few days, you may receive an email or text message from [employer] with a link to a survey related to your  clinical pharmacist.\"     To schedule another MTM appointment, please call the clinic directly or you may call the MTM scheduling line at [phone] or toll-free at [phone].     My Clinical Pharmacist's contact information:                                                      Please feel free to contact me with any questions or concerns you have.      Gema Morales PharmD, [facility]  Medication Therapy Management Provider  Phone: [phone]  [email or web address]    "

## 2023-08-23 ENCOUNTER — LAB (OUTPATIENT)
Dept: LAB | Facility: CLINIC | Age: 63
End: 2023-08-23
Payer: COMMERCIAL

## 2023-08-23 DIAGNOSIS — E11.9 TYPE 2 DIABETES MELLITUS WITHOUT COMPLICATION, WITHOUT LONG-TERM CURRENT USE OF INSULIN (H): ICD-10-CM

## 2023-08-23 LAB — HBA1C MFR BLD: 6.8 % (ref 0–5.6)

## 2023-08-23 PROCEDURE — 36415 COLL VENOUS BLD VENIPUNCTURE: CPT

## 2023-08-23 PROCEDURE — 83036 HEMOGLOBIN GLYCOSYLATED A1C: CPT

## 2023-08-29 ENCOUNTER — VIRTUAL VISIT (OUTPATIENT)
Dept: PHARMACY | Facility: CLINIC | Age: 63
End: 2023-08-29
Payer: COMMERCIAL

## 2023-08-29 DIAGNOSIS — E78.5 HYPERLIPIDEMIA LDL GOAL <100: ICD-10-CM

## 2023-08-29 DIAGNOSIS — Z72.0 TOBACCO ABUSE: ICD-10-CM

## 2023-08-29 DIAGNOSIS — Z78.9 TAKES DIETARY SUPPLEMENTS: ICD-10-CM

## 2023-08-29 DIAGNOSIS — I10 HTN (HYPERTENSION), BENIGN: ICD-10-CM

## 2023-08-29 DIAGNOSIS — E11.9 TYPE 2 DIABETES MELLITUS WITHOUT COMPLICATION, WITHOUT LONG-TERM CURRENT USE OF INSULIN (H): Primary | ICD-10-CM

## 2023-08-29 PROCEDURE — 99207 PR NO CHARGE LOS: CPT | Performed by: PHARMACIST

## 2023-08-29 RX ORDER — DULAGLUTIDE 1.5 MG/.5ML
1.5 INJECTION, SOLUTION SUBCUTANEOUS
Qty: 6 ML | Refills: 1 | Status: SHIPPED | OUTPATIENT
Start: 2023-08-29 | End: 2023-11-22

## 2023-08-29 NOTE — PROGRESS NOTES
Medication Therapy Management (MTM) Encounter    ASSESSMENT:                            Medication Adherence/Access: No issues identified    Type 2 Diabetes:   Stable. Patient is meeting A1c goal of < 7%.     Smoking Cessation:  Stable.    Hypertension:   Patient is not meeting blood pressure goal of < 130/80mmHg, is meeting less aggressive goal <140/90mmHg.  Recent cessation of smoking should also help blood pressure control, will continue to monitor.    Hyperlipidemia:   Stable.     Supplements:  Stable.    PLAN:                            Continue current medication regimen    Follow-up: Return in about 1 year (around 8/29/2024) for Follow-up Medication Review.    SUBJECTIVE/OBJECTIVE:                          Flora Miranda is a 63 year old female called for a follow-up visit from 7/31/2023.       Reason for visit: Routine follow-up.    Tobacco: She reports that she quit smoking about 7 weeks ago. Her smoking use included cigarettes. She has a 22.50 pack-year smoking history. She has never used smokeless tobacco.  Alcohol: Less than 1 beverages / week    Medication Adherence/Access: no issues reported    Type 2 Diabetes:    Metformin 1000mg twice daily  Glipizide 10mg twice daily  Trulicity 1.5mg weekly  Aspirin 81mg daily  Patient is not experiencing side effects.  At last MTM visit there was discussion about increasing Trulicity to hopefully come off glipizide and reduce risk of hypoglycemia, she doesn't wish to make any changes at this point.  Blood sugar monitoring: None - Susannah is no longer compatible with her new smart phone  Current diabetes symptoms: none   Diet/Exercise: No changes  Lab Results   Component Value Date    UMALCR 35.71 (H) 08/10/2021      Lab Results   Component Value Date    A1C 6.8 08/23/2023    A1C 7.0 02/06/2023    A1C 6.5 09/21/2022    A1C 7.6 05/11/2022    A1C 8.0 08/10/2021      Smoking cessation:    Chantix 1mg twice daily   Patient reports things going well.  She has not had a  cigarette in 2 months - quit with her boyfriend at the same time.     Hypertension:   Lisinopril 10mg daily  Patient reports no current medication side effects.  Patient does not self-monitor blood pressure.    BP Readings from Last 3 Encounters:   02/06/23 138/86   05/16/22 132/84   12/28/21 130/78      Hyperlipidemia:   Atorvastatin 20mg daily  Patient reports no significant myalgias or other side effects.  Recent Labs   Lab Test 03/28/23  0951 05/11/22  1136   CHOL 114 103   HDL 38* 36*   LDL 54 51   TRIG 109 82      Supplements:   Magnesium 500mg twice daily  Daily multivitamin   Vitamin D 125mcg daily  Melaluca Cell Wise daily  No reported issues at this time.      Today's Vitals: There were no vitals taken for this visit.  ----------------    I spent 12 minutes with this patient today. A copy of the visit note was provided to the patient's provider(s).    A summary of these recommendations was sent via [employer].    Gema Figueredo, PharmD, BCACP  Medication Therapy Management Provider  Pager: [phone]     Telemedicine Visit Details  Type of service:  Telephone visit  Start Time: 8:32 AM  End Time: 8:44 AM     Medication Therapy Recommendations  No medication therapy recommendations to display

## 2023-08-29 NOTE — PATIENT INSTRUCTIONS
"Recommendations from today's MTM visit:                                                    MTM (medication therapy management) is a service provided by a clinical pharmacist designed to help you get the most of out of your medicines.   Today we reviewed what your medicines are for, how to know if they are working, that your medicines are safe and how to make your medicine regimen as easy as possible.      Continue current medication regimen.     Follow-up: Return in about 1 year (around 8/29/2024) for Follow-up Medication Review.    It was great speaking with you today.  I value your experience and would be very thankful for your time in providing feedback in our clinic survey. In the next few days, you may receive an email or text message from [employer] with a link to a survey related to your  clinical pharmacist.\"     To schedule another MTM appointment, please call the clinic directly or you may call the MTM scheduling line at [phone] or toll-free at [phone].     My Clinical Pharmacist's contact information:                                                      Please feel free to contact me with any questions or concerns you have.      Gema Figueredo, [place], [facility]  Medication Therapy Management Provider  Pager: [phone]    "

## 2023-08-30 NOTE — TELEPHONE ENCOUNTER
Medication filled 1 time as pt is due for a follow-up in clinic. Pharmacy has been notified to inform patient to call clinic and schedule appointment.   Prescription approved per [facility] Refill Protocol.  Justice L. Phoenix, RN         none

## 2023-09-30 ENCOUNTER — HEALTH MAINTENANCE LETTER (OUTPATIENT)
Age: 63
End: 2023-09-30

## 2023-11-21 ENCOUNTER — TELEPHONE (OUTPATIENT)
Dept: FAMILY MEDICINE | Facility: CLINIC | Age: 63
End: 2023-11-21
Payer: COMMERCIAL

## 2023-11-21 NOTE — TELEPHONE ENCOUNTER
Prior Authorization Retail Medication Request    Medication/Dose: Trulicity 1.5mg  New/renewal/insurance change PA/secondary ins. PA: renewal  Previously Tried and Failed:  Glipizide, Lantus, Metformin  Rationale:  Patient has been maintained on Trulicity since March of 2022 with well controlled blood sugar, last A1c 8/23/2023 was 6.8%    Insurance   Primary: [employer]  Insurance ID:  [id number]     Gema Figueredo, PharmD, BCACP  Medication Therapy Management Provider  [phone]

## 2023-11-22 ENCOUNTER — MYC MEDICAL ADVICE (OUTPATIENT)
Dept: PHARMACY | Facility: CLINIC | Age: 63
End: 2023-11-22
Payer: COMMERCIAL

## 2023-11-22 DIAGNOSIS — E11.9 TYPE 2 DIABETES MELLITUS WITHOUT COMPLICATION, WITHOUT LONG-TERM CURRENT USE OF INSULIN (H): ICD-10-CM

## 2023-11-22 RX ORDER — DULAGLUTIDE 1.5 MG/.5ML
1.5 INJECTION, SOLUTION SUBCUTANEOUS
Qty: 6 ML | Refills: 0 | Status: SHIPPED | OUTPATIENT
Start: 2023-11-22

## 2023-11-22 RX ORDER — ATORVASTATIN CALCIUM 20 MG/1
20 TABLET, FILM COATED ORAL DAILY
Qty: 90 TABLET | Refills: 0 | Status: SHIPPED | OUTPATIENT
Start: 2023-11-22

## 2023-11-22 RX ORDER — GLIPIZIDE 10 MG/1
10 TABLET ORAL
Qty: 180 TABLET | Refills: 0 | Status: SHIPPED | OUTPATIENT
Start: 2023-11-22

## 2023-11-26 NOTE — TELEPHONE ENCOUNTER
Prior Authorization Not Needed per Insurance    Medication: TRULICITY 1.5 MG/0.5ML SC SOPN  Insurance Company: [employer] Non-Specialty PA's - Phone [phone] Fax [phone]  Expected CoPay: $    Pharmacy Filling the Rx: [facility] - [place], [place] - [address]  Pharmacy Notified: Yes  Patient Notified: Pharmacy will notify patient.

## 2023-12-09 ENCOUNTER — HEALTH MAINTENANCE LETTER (OUTPATIENT)
Age: 63
End: 2023-12-09

## 2023-12-27 ENCOUNTER — OFFICE VISIT (OUTPATIENT)
Dept: URGENT CARE | Facility: URGENT CARE | Age: 63
End: 2023-12-27
Payer: COMMERCIAL

## 2023-12-27 ENCOUNTER — TELEPHONE (OUTPATIENT)
Dept: NURSING | Facility: CLINIC | Age: 63
End: 2023-12-27

## 2023-12-27 VITALS
RESPIRATION RATE: 12 BRPM | DIASTOLIC BLOOD PRESSURE: 85 MMHG | HEART RATE: 82 BPM | SYSTOLIC BLOOD PRESSURE: 143 MMHG | WEIGHT: 244.2 LBS | BODY MASS INDEX: 43.26 KG/M2 | OXYGEN SATURATION: 96 % | TEMPERATURE: 98.5 F

## 2023-12-27 DIAGNOSIS — R09.81 NASAL CONGESTION: ICD-10-CM

## 2023-12-27 DIAGNOSIS — R06.2 WHEEZING: ICD-10-CM

## 2023-12-27 DIAGNOSIS — J30.89 NON-SEASONAL ALLERGIC RHINITIS, UNSPECIFIED TRIGGER: ICD-10-CM

## 2023-12-27 DIAGNOSIS — R05.1 ACUTE COUGH: ICD-10-CM

## 2023-12-27 DIAGNOSIS — J01.90 ACUTE SINUSITIS WITH COEXISTING CONDITION, NEED PROPHYLACTIC TREATMENT: Primary | ICD-10-CM

## 2023-12-27 LAB
DEPRECATED S PYO AG THROAT QL EIA: NEGATIVE
FLUAV AG SPEC QL IA: NEGATIVE
FLUBV AG SPEC QL IA: NEGATIVE
GROUP A STREP BY PCR: NOT DETECTED
SARS-COV-2 RNA RESP QL NAA+PROBE: POSITIVE

## 2023-12-27 PROCEDURE — 87651 STREP A DNA AMP PROBE: CPT | Performed by: PHYSICIAN ASSISTANT

## 2023-12-27 PROCEDURE — 87635 SARS-COV-2 COVID-19 AMP PRB: CPT | Performed by: PHYSICIAN ASSISTANT

## 2023-12-27 PROCEDURE — 99214 OFFICE O/P EST MOD 30 MIN: CPT | Performed by: PHYSICIAN ASSISTANT

## 2023-12-27 PROCEDURE — 87804 INFLUENZA ASSAY W/OPTIC: CPT | Performed by: PHYSICIAN ASSISTANT

## 2023-12-27 RX ORDER — PREDNISONE 20 MG/1
20 TABLET ORAL DAILY
Qty: 5 TABLET | Refills: 0 | Status: SHIPPED | OUTPATIENT
Start: 2023-12-27 | End: 2024-01-01

## 2023-12-27 RX ORDER — ALBUTEROL SULFATE 90 UG/1
2 AEROSOL, METERED RESPIRATORY (INHALATION) EVERY 6 HOURS PRN
Qty: 18 G | Refills: 1 | Status: SHIPPED | OUTPATIENT
Start: 2023-12-27

## 2023-12-27 RX ORDER — FLUTICASONE PROPIONATE 50 MCG
1 SPRAY, SUSPENSION (ML) NASAL DAILY
Qty: 18.2 ML | Refills: 1 | Status: SHIPPED | OUTPATIENT
Start: 2023-12-27

## 2023-12-27 NOTE — PATIENT INSTRUCTIONS
(R05.1) Acute cough  (primary encounter diagnosis)  Comment:   Plan: Influenza A & B Antigen - Clinic Collect,         Streptococcus A Rapid Screen w/Reflex to PCR -         Clinic Collect, Symptomatic COVID-19 Virus         (Coronavirus) by PCR Nose, Group A         Streptococcus PCR Throat Swab, predniSONE         (DELTASONE) 20 MG tablet            (R09.81) Nasal congestion  Comment:   Plan: Influenza A & B Antigen - Clinic Collect, Group        A Streptococcus PCR Throat Swab            (J30.89) Non-seasonal allergic rhinitis, unspecified trigger  Comment:   Plan: amoxicillin-clavulanate (AUGMENTIN) 875-125 MG         tablet            (J01.90) Acute sinusitis with coexisting condition, need prophylactic treatment  Comment:   Plan: fluticasone (FLONASE) 50 MCG/ACT nasal spray,         amoxicillin-clavulanate (AUGMENTIN) 875-125 MG         tablet            Stay on the Flonase for minimum of 2 weeks.  Consider staying on longer if weather remains above freezing.      Use saline nasal spray during the day to help flush out the nose and moisturize.

## 2023-12-27 NOTE — LETTER
December 27, 2023      Flora Miranda  [address]   [place] [address]        To Whom It May Concern:    Flora HUITRON Miranda was seen in our clinic today.      Sincerely,          Imelda VALLADARES, ALEXUS

## 2023-12-27 NOTE — PROGRESS NOTES
"Patient presents with:  Urgent Care: Pt reports cough, congestion and sinus pressure and scratchy throat X 2 days.    (R05.1) Acute cough  (primary encounter diagnosis)  Comment:   Plan: Influenza A & B Antigen - Clinic Collect,         Streptococcus A Rapid Screen w/Reflex to PCR -         Clinic Collect, Symptomatic COVID-19 Virus         (Coronavirus) by PCR Nose, Group A         Streptococcus PCR Throat Swab, predniSONE         (DELTASONE) 20 MG tablet            (R09.81) Nasal congestion  Comment:   Plan: Influenza A & B Antigen - Clinic Collect, Group        A Streptococcus PCR Throat Swab            (J30.89) Non-seasonal allergic rhinitis, unspecified trigger  Comment:   Plan: amoxicillin-clavulanate (AUGMENTIN) 875-125 MG         tablet            (J01.90) Acute sinusitis with coexisting condition, need prophylactic treatment  Comment:   Plan: fluticasone (FLONASE) 50 MCG/ACT nasal spray,         amoxicillin-clavulanate (AUGMENTIN) 875-125 MG         tablet            Stay on the Flonase for minimum of 2 weeks.  Consider staying on longer if weather remains above freezing.      Use saline nasal spray during the day to help flush out the nose and moisturize.        At the end of the encounter, I discussed results, diagnosis, medications. Discussed red flags for immediate return to clinic/ER, as well as indications for follow up if no improvement. Patient understood and agreed to plan. Patient was stable for discharge     If not improving or if condition worsens, follow up with your Primary Care Provider            SUBJECTIVE:   Flora Miranda is a 63 year old female who presents today with a cough for a couple of days and sinus congestion for \"some time\".  Denies any fevers.    Some shortness of breath.      Quit smoking July 2023.      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      Current Outpatient Medications   Medication Sig Dispense Refill    Multiple Vitamins-Iron (DAILY-SUSY/IRON/BETA-CAROTENE) TABS TAKE 1 TABLET BY MOUTH DAILY. (Patient not taking: Reported on 10/19/2020) 30 tablet 7     Social History     Tobacco Use    Smoking status: Never Smoker    Smokeless tobacco: Never Used   Substance Use Topics    Alcohol use: Not on file     Family History   Problem Relation Age of Onset    Diabetes Mother     Diabetes Father          ROS:    10 point ROS of systems including Constitutional, Eyes, Respiratory, Cardiovascular, Gastroenterology, Genitourinary, Integumentary, Muscularskeletal, Psychiatric ,neurological were all negative except for pertinent positives noted in my HPI       OBJECTIVE:  BP (!) 143/85   Pulse 82   Temp 98.5  F (36.9  C) (Tympanic)   Resp 12   Wt 110.8 kg (244 lb 3.2 oz)   SpO2 94%   BMI 43.26 kg/m    Physical Exam:  GENERAL APPEARANCE: healthy, alert and no distress  EYES: EOMI,  PERRL, conjunctiva clear  HENT: ear canals and TM's normal.  Nose and mouth without ulcers, erythema or lesions  HENT: nasal turbinates boggy with bluish hue and rhinorrhea yellow  NECK: supple, nontender, no lymphadenopathy  RESP:wheezing and a few rhonchi that improve with cough.  CV: regular rates and rhythm, normal S1 S2, no murmur noted  NEURO: Normal strength and tone, sensory exam grossly normal,  normal speech and mentation  SKIN: no suspicious lesions or rashes    Results for orders placed or performed in visit on 12/27/23   Influenza A & B Antigen - Clinic Collect     Status: Normal    Specimen: Nose; Swab   Result Value Ref Range    Influenza A antigen Negative Negative    Influenza B antigen Negative Negative    Narrative    Test results must be correlated with clinical data. If necessary, results should be confirmed by a molecular assay or viral  culture.   Streptococcus A Rapid Screen w/Reflex to PCR - Clinic Collect     Status: Normal    Specimen: Throat; Swab   Result Value Ref Range    Group A Strep antigen Negative Negative

## 2023-12-27 NOTE — TELEPHONE ENCOUNTER
Patient classified as COVID treatment eligible by Epic high risk algorithm:  Yes    Coronavirus (COVID-19) Notification    Reason for call  Notify of POSITIVE COVID-19 lab result, assess symptoms,  review [facility] recommendations    Lab Result   Lab test for 2019-nCoV rRt-PCR or SARS-COV-2 PCR  Oropharyngeal AND/OR nasopharyngeal swabs were POSITIVE for 2019-nCoV RNA [OR] SARS-COV-2 RNA (COVID-19) RNA     We have been unable to reach patient by phone at this time to notify of their Positive COVID-19 result.    Left voicemail message requesting a call back to [phone] [facility] for results.        A Positive COVID-19 letter will be sent via [employer] or the mail.    Navin Olmos

## 2024-01-02 ENCOUNTER — MYC MEDICAL ADVICE (OUTPATIENT)
Dept: PHARMACY | Facility: CLINIC | Age: 64
End: 2024-01-02

## 2024-01-03 ENCOUNTER — NURSE TRIAGE (OUTPATIENT)
Dept: FAMILY MEDICINE | Facility: CLINIC | Age: 64
End: 2024-01-03

## 2024-01-03 NOTE — TELEPHONE ENCOUNTER
"    Writer called patient regarding above.     COVID -19 Diagnosed or Suspected    COVID-19 DIAGNOSIS: urgent care on the 12/27 - treated with prednisone and abx   COVID EXPOSURE: denies   ONSET: around December 23rd   WORST SYMPTOM: nasal congestion   COUGH: mild   FEVER: denies   RESPIRATORY STATUS: denies breathing difficulty or chest pain   BETTER-SAME-WORSE: better since onset   OTHER SYMPTOMS: sinus congestion   HIGH RISK DISEASE: yes - age 63   VACCINE: did not receive any vaccines   O2 SATURATION MONITOR: does not have     Reviewed below information with patient:     Instructions for Patients    What treatments are available?  Over-the-counter medicines may help with your symptoms such as runny or stuffy nose, cough, chills, or fever. Talk to your care team about your options.     Some people are at high risk for severe illness (for example if you have a weak immune system, you're 50 years or older, or you have certain medical problems). If your risk is high and your symptoms started in the last 5 days, we strongly recommend for you to get COVID treatment as soon as possible. There are safe and effective medicines available that can make you feel better faster and prevent hospitalization and death.    To discuss COVID treatment you can:  Call your clinic OR [phone] ([phone]) and ask to speak to a nurse about a positive COVID test.  Send a [employer] message to your care team. In [employer], select \"Ask a Medical Question\" then \"Do I need an appointment\" and put COVID in the subject line. Please include a phone number to call you back in the message.      How do I self-isolate?  You isolate when you have symptoms of COVID or a test shows you have COVID, even if you don t have symptoms.   If you DO have symptoms:  Stay home and away from others  For at least 5 days after your symptoms started, AND   You are fever free for 24 hours (with no medicine that reduces fever), AND  Your other symptoms are " better.  Wear a mask for 10 full days any time you are around others.  If you DON T have symptoms:  Stay at home and away from others for at least 5 days after your positive test.  Wear a mask for 10 full days any time you are around others.    What are the symptoms of COVID-19?  Symptoms can include fever, cough, shortness of breath, chills, headache, muscle pain sore throat, fatigue, runny or stuffy nose, and loss of taste and smell. Other less common symptoms include nausea, vomiting, or diarrhea (watery stools).    Know when to call 911. Emergency warning signs include:  Trouble breathing or shortness of breath  Pain or pressure in the chest that doesn't go away  Feeling confused like you haven't felt before, or not being able to wake up  Bluish-colored lips or face    How can I take care of myself?  Get lots of rest. Drink extra fluids (unless a doctor has told you not to).  Take Tylenol (acetaminophen) for fever or pain. If you have liver or kidney problems, ask your family doctor if it's okay to take Tylenol   Adults can take either:   650 mg (two 325 mg pills) every 4 to 6 hours, or...   1,000 mg (two 500 mg pills) every 8 hours as needed.  Note: Don't take more than 3,000 mg in one day. Acetaminophen is found in many medicines (both prescribed and over the counter medicines). Read all labels to be sure you don't take too much.  For children, check the Tylenol bottle for the right dose. The dose is based on the child's age or weight.  Take over the counter medicines for your symptoms as needed. Talk to your pharmacist.  If you have other health problems (like cancer, heart failure, an organ transplant, or severe kidney disease): Call your specialty clinic if you don't feel better in the next 2 days.    Where can I get more information?  [facility] COVID-19 Resource Hub: [email or web address]   CDC Quarantine & Isolation:  https://www.cdc.gov/coronavirus/2019-ncov/your-health/quarantine-isolation.html   CDC - What to Do If You're Sick: https://www.cdc.gov/coronavirus/2019-ncov/if-you-are-sick/index.html  Learn about the ACTIV-6 Clinical Trial: activ6.[facility] or call (294)-443-5943     Patient expressed verbal understanding and is agreeable to above information including when to call 911. Informed patient that she is outside of 5 day treatment window for paxlovid as symptoms started on 12/23. Did offer virtual visit if patient would like to discuss alternatives. Patient declines appt at this time, states she is overall feeling better and will continue to monitor symptoms at home, will callback if any new/worsening or not resolving symptoms.     Routing to covering provider for PCP (previous patient of Dr. Renee) as LAZ - thank you!     Barb Kapoor RN  [employer]    Reason for Disposition   HIGH RISK patient (e.g., weak immune system, age > 64 years, obesity with BMI of 30 or higher, pregnant, chronic lung disease or other chronic medical condition) and COVID symptoms (e.g., cough, fever)  (Exceptions: Already seen by doctor or NP/PA and no new or worsening symptoms.)    Additional Information   Negative: SEVERE difficulty breathing (e.g., struggling for each breath, speaks in single words)   Negative: Difficult to awaken or acting confused (e.g., disoriented, slurred speech)   Negative: Bluish (or gray) lips or face now   Negative: Shock suspected (e.g., cold/pale/clammy skin, too weak to stand, low BP, rapid pulse)   Negative: Sounds like a life-threatening emergency to the triager   Negative: Diagnosed or suspected COVID-19 and symptoms lasting 3 or more weeks   Negative: COVID-19 exposure and no symptoms   Negative: COVID-19 vaccine reaction suspected (e.g., fever, headache, muscle aches) occurring 1 to 3 days after getting vaccine   Negative: COVID-19 vaccine, questions about   Negative: Lives with someone  known to have influenza (flu test positive) and flu-like symptoms (e.g., cough, runny nose, sore throat, SOB; with or without fever)   Negative: Possible COVID-19 symptoms and triager concerned about severity of symptoms or other causes   Negative: COVID-19 and breastfeeding, questions about   Negative: SEVERE or constant chest pain or pressure  (Exception: Mild central chest pain, present only when coughing.)   Negative: MODERATE difficulty breathing (e.g., speaks in phrases, SOB even at rest, pulse 100-120)   Negative: Headache and stiff neck (can't touch chin to chest)   Negative: Oxygen level (e.g., pulse oximetry) 90% or lower   Negative: Chest pain or pressure  (Exception: MILD central chest pain, present only when coughing.)   Negative: Drinking very little and dehydration suspected (e.g., no urine > 12 hours, very dry mouth, very lightheaded)   Negative: Patient sounds very sick or weak to the triager   Negative: MILD difficulty breathing (e.g., minimal/no SOB at rest, SOB with walking, pulse <100)   Negative: Fever > 103 F (39.4 C)   Negative: Fever > 101 F (38.3 C) and over 60 years of age   Negative: Fever > 100.0 F (37.8 C) and bedridden (e.g., CVA, chronic illness, recovering from surgery)    Protocols used: Coronavirus (COVID-19) Diagnosed or [id number]

## 2024-01-15 NOTE — TELEPHONE ENCOUNTER
Writer reviewing open encounters.    Per chart review, covering provider is aware of FYI:        Closing encounter.    Barb Kapoor RN  [facility]

## 2024-04-27 ENCOUNTER — HEALTH MAINTENANCE LETTER (OUTPATIENT)
Age: 64
End: 2024-04-27

## 2024-09-14 ENCOUNTER — HEALTH MAINTENANCE LETTER (OUTPATIENT)
Age: 64
End: 2024-09-14

## 2024-11-06 ENCOUNTER — TELEPHONE (OUTPATIENT)
Dept: PHARMACY | Facility: CLINIC | Age: 64
End: 2024-11-06

## 2024-11-06 NOTE — TELEPHONE ENCOUNTER
We have been unable to reach this patient for MTM follow-up after several attempts. We will stop reaching out to the patient at this time. Please let us know if we can assist in this patient's care in the future.

## 2025-01-11 ENCOUNTER — HEALTH MAINTENANCE LETTER (OUTPATIENT)
Age: 65
End: 2025-01-11

## 2025-04-20 ENCOUNTER — HEALTH MAINTENANCE LETTER (OUTPATIENT)
Age: 65
End: 2025-04-20

## 2025-05-11 ENCOUNTER — HEALTH MAINTENANCE LETTER (OUTPATIENT)
Age: 65
End: 2025-05-11

## 2025-08-03 ENCOUNTER — HEALTH MAINTENANCE LETTER (OUTPATIENT)
Age: 65
End: 2025-08-03